# Patient Record
Sex: FEMALE | Race: WHITE | NOT HISPANIC OR LATINO | Employment: OTHER | URBAN - METROPOLITAN AREA
[De-identification: names, ages, dates, MRNs, and addresses within clinical notes are randomized per-mention and may not be internally consistent; named-entity substitution may affect disease eponyms.]

---

## 2018-03-14 ENCOUNTER — OFFICE VISIT (OUTPATIENT)
Dept: ORTHOPEDIC SURGERY | Facility: CLINIC | Age: 68
End: 2018-03-14

## 2018-03-14 VITALS
BODY MASS INDEX: 31.43 KG/M2 | SYSTOLIC BLOOD PRESSURE: 116 MMHG | HEART RATE: 73 BPM | HEIGHT: 64 IN | WEIGHT: 184.08 LBS | DIASTOLIC BLOOD PRESSURE: 78 MMHG

## 2018-03-14 DIAGNOSIS — G62.9 NEUROPATHY: ICD-10-CM

## 2018-03-14 DIAGNOSIS — M21.372 FOOT DROP, LEFT: ICD-10-CM

## 2018-03-14 DIAGNOSIS — M19.079 ARTHRITIS OF FOOT: Primary | ICD-10-CM

## 2018-03-14 PROCEDURE — 99204 OFFICE O/P NEW MOD 45 MIN: CPT | Performed by: PHYSICIAN ASSISTANT

## 2018-03-14 RX ORDER — OMEPRAZOLE 40 MG/1
40 CAPSULE, DELAYED RELEASE ORAL 2 TIMES DAILY
Refills: 1 | COMMUNITY
Start: 2018-01-19 | End: 2022-09-30

## 2018-03-14 NOTE — PROGRESS NOTES
[facility] Note    Subjective     Patient: Tony Brown  : 1950    Primary Care Provider: SOULEYMANE Sales    Requesting Provider: As above    Pain of the Left Foot      History    Chief Complaint: Left foot/leg pain and numbness    History of Present Illness: This is a very pleasant 67-year-old female presenting today to discuss her left leg and foot pain and numbness.  History ports it is been going on for years.  It has progressed.  She has constant pain and numbness.  She does have some increased pain in the foot with weightbearing and walking.  She complains of burning pain, sharp pains.  She rates at 10/10.  She has a history of lumbar pathology and foot drop.  She is seeing Dr. Moraes as well as Dr. Pelaez in the past.  He sees pain management for her back with hydrocodone and morphine.  She has had an SFO brace in the past but does not tolerate it well.  She uses a cane daily for ambulation.  She is here today to see if there are any other options to help improve her pain as well as the numbness in her foot.  She cannot recall taking any Neurontin or nerve medicine in the past.    Current Outpatient Prescriptions on File Prior to Visit   Medication Sig Dispense Refill   • HYDROcodone-acetaminophen (NORCO)  MG per tablet Take 1 tablet by mouth every 6 (six) hours as needed.     • morphine (MSIR) 30 MG tablet Take 1 tablet by mouth every 12 (twelve) hours as needed.     • zolpidem (AMBIEN) 10 MG tablet Take 1 tablet by mouth at night as needed.     • [DISCONTINUED] esomeprazole (NEXIUM) 40 MG capsule Take 1 capsule by mouth daily.       No current facility-administered medications on file prior to visit.       Allergies   Allergen Reactions   • Promethazine       Past Medical History:   Diagnosis Date   • Anxiety    • Arthritis      History reviewed. No pertinent surgical history.  Family History   Problem Relation Age of Onset   • Alcohol abuse Mother    •  "Diabetes Mother    • Heart disease Mother    • Stroke Mother       Social History     Social History   • Marital status: Single     Spouse name: N/A   • Number of children: N/A   • Years of education: N/A     Occupational History   • Not on file.     Social History Main Topics   • Smoking status: Never Smoker   • Smokeless tobacco: Never Used   • Alcohol use No   • Drug use: No   • Sexual activity: Defer     Other Topics Concern   • Not on file     Social History Narrative   • No narrative on file        Review of Systems   HENT: Negative.    Eyes: Negative.    Respiratory: Positive for shortness of breath.    Cardiovascular: Negative.    Gastrointestinal: Negative.    Endocrine: Negative.    Genitourinary: Negative.    Musculoskeletal: Positive for back pain, joint swelling, neck pain and neck stiffness.        Foot Pain    Skin: Negative.    Allergic/Immunologic: Negative.    Neurological: Positive for facial asymmetry, weakness and numbness.   Hematological: Bruises/bleeds easily.   Psychiatric/Behavioral: Negative.        The following portions of the patient's history were reviewed and updated as appropriate: allergies, current medications, past family history, past medical history, past social history, past surgical history and problem list.      Objective      Physical Exam  /78   Pulse 73   Ht 162.5 cm (63.98\")   Wt 83.5 kg (184 lb 1.4 oz)   BMI 31.62 kg/m²     Body mass index is 31.62 kg/m².    GENERAL: Body habitus: obese    Lower extremity edema: Left: trace; Right: trace    Varicose veins:  Left: moderate; Right: moderate    Gait: using cane     Mental Status:  awake and alert; oriented to person, place, and time    Voice:  clear  SKIN:  Normal    Hair Growth:  Right:normal; Left:  normal  HEENT: Head: Normocephalic, atraumatic,  without obvious abnormality.  eye: normal external eye, no icterus   PULM:  Repiratory effort normal    Ortho Exam  V:  Dorsalis Pedis:  Right: 2+; " Left:2+    Posterior Tibial: Right:2+; Left:2+    Capillary Refill:  Brisk  MSK:      Ankle:  Right: non tender, ROM  normal and motor function  normal; Left:  non tender and motor function  3/5 anterior tib/0/5 posterior tib/4/5 peroneal/4/5 gatroc/soleus      Foot:  Right:  non tender; Left:  non tender      NEURO: Heel Walking:  Right:  unable to test; Left:  unable to test    Toe Walking:  Right:  unable to test; Left:  unable to test     [place] 5.07 monofilament test: absent    Lower extremity sensation: diminished  Left normal right    Calf Atrophy:none       Medical Decision Making    Data Review:   ordered and reviewed x-rays today    Assessment:  1. Arthritis of foot    2. Foot drop, left    3. Neuropathy        Plan:  1.  Left foot arthritis.  I reviewed clinical findings and x-rays with the patient today.  X-rays show significant arthritis throughout the midfoot and hindfoot.  There is no evidence of fracture or anything more worrisome.  I explained to the patient that some of her pain weightbearing and walking is likely secondary to arthritis.  I would recommend a pair of custom orthotics to help support the joints and I've given her prescription for this.    2.  Patient has left foot drop with neuropathy.  I reviewed clinical findings with the patient today.  On exam, she has weakness in the anterior tib and posterior tib.  She is insensate to [place].  She reports that this has been ongoing for several years.  She has seen Dr. Moraes as well as Dr. Pelaez years ago for the problem.  She is here today she has any to see if there are any other options.  I explained to the patient that there is nothing that we had our practice can offer her today.  I've given her prescription for a new AFO brace to see if they can give her something that she can tolerate better  Further that think this is important so that she does not fall and hurt herself.  I spent suspect that her weakness and pain  and numbness is coming from her lumbar spine.  Recommendation today is that we get her scheduled to see neurosurgery.  She is seen Dr. Pelaez in the past and she does not want to see him again that we will get her to see another one of his partners.  She will return to see us as needed.      Natacha Rodriguez PA-C  03/14/18  3:42 PM

## 2018-10-31 ENCOUNTER — TRANSCRIBE ORDERS (OUTPATIENT)
Dept: ADMINISTRATIVE | Facility: HOSPITAL | Age: 68
End: 2018-10-31

## 2018-10-31 DIAGNOSIS — R10.11 RUQ ABDOMINAL PAIN: Primary | ICD-10-CM

## 2018-11-16 ENCOUNTER — APPOINTMENT (OUTPATIENT)
Dept: NUCLEAR MEDICINE | Facility: HOSPITAL | Age: 68
End: 2018-11-16

## 2019-09-24 ENCOUNTER — HOSPITAL ENCOUNTER (EMERGENCY)
Facility: HOSPITAL | Age: 69
Discharge: HOME | End: 2019-09-24
Attending: EMERGENCY MEDICINE | Admitting: EMERGENCY MEDICINE

## 2019-09-24 VITALS
BODY MASS INDEX: 30.82 KG/M2 | HEIGHT: 65 IN | DIASTOLIC BLOOD PRESSURE: 67 MMHG | WEIGHT: 185 LBS | HEART RATE: 63 BPM | OXYGEN SATURATION: 97 % | RESPIRATION RATE: 20 BRPM | SYSTOLIC BLOOD PRESSURE: 143 MMHG | TEMPERATURE: 98 F

## 2019-09-24 DIAGNOSIS — N39.0 URINARY TRACT INFECTION IN ELDERLY PATIENT: ICD-10-CM

## 2019-09-24 DIAGNOSIS — T78.40XA ALLERGIC REACTION TO DRUG, INITIAL ENCOUNTER: Primary | ICD-10-CM

## 2019-09-24 DIAGNOSIS — J01.90 ACUTE NON-RECURRENT SINUSITIS, UNSPECIFIED LOCATION: ICD-10-CM

## 2019-09-24 LAB
ALBUMIN SERPL-MCNC: 4.4 G/DL (ref 3.5–5.2)
ALBUMIN/GLOB SERPL: 1.2 G/DL
ALP SERPL-CCNC: 103 U/L (ref 39–117)
ALT SERPL W P-5'-P-CCNC: 14 U/L (ref 1–33)
ANION GAP SERPL CALCULATED.3IONS-SCNC: 18 MMOL/L (ref 5–15)
AST SERPL-CCNC: 27 U/L (ref 1–32)
BACTERIA UR QL AUTO: ABNORMAL /HPF
BASOPHILS # BLD AUTO: 0.02 10*3/MM3 (ref 0–0.2)
BASOPHILS NFR BLD AUTO: 0.3 % (ref 0–1.5)
BILIRUB SERPL-MCNC: 0.3 MG/DL (ref 0.2–1.2)
BILIRUB UR QL STRIP: NEGATIVE
BUN BLD-MCNC: 9 MG/DL (ref 8–23)
BUN/CREAT SERPL: 9.6 (ref 7–25)
CALCIUM SPEC-SCNC: 9.1 MG/DL (ref 8.6–10.5)
CHLORIDE SERPL-SCNC: 103 MMOL/L (ref 98–107)
CLARITY UR: ABNORMAL
CO2 SERPL-SCNC: 19 MMOL/L (ref 22–29)
COLOR UR: ABNORMAL
CREAT BLD-MCNC: 0.94 MG/DL (ref 0.57–1)
DEPRECATED RDW RBC AUTO: 40.9 FL (ref 37–54)
EOSINOPHIL # BLD AUTO: 0.2 10*3/MM3 (ref 0–0.4)
EOSINOPHIL NFR BLD AUTO: 3.2 % (ref 0.3–6.2)
ERYTHROCYTE [DISTWIDTH] IN BLOOD BY AUTOMATED COUNT: 12.6 % (ref 12.3–15.4)
GFR SERPL CREATININE-BSD FRML MDRD: 59 ML/MIN/1.73
GLOBULIN UR ELPH-MCNC: 3.6 GM/DL
GLUCOSE BLD-MCNC: 172 MG/DL (ref 65–99)
GLUCOSE UR STRIP-MCNC: NEGATIVE MG/DL
GRAN CASTS URNS QL MICRO: ABNORMAL /LPF
HCT VFR BLD AUTO: 46.6 % (ref 34–46.6)
HGB BLD-MCNC: 15.1 G/DL (ref 12–15.9)
HGB UR QL STRIP.AUTO: NEGATIVE
HYALINE CASTS UR QL AUTO: ABNORMAL /LPF
IMM GRANULOCYTES # BLD AUTO: 0.02 10*3/MM3 (ref 0–0.05)
IMM GRANULOCYTES NFR BLD AUTO: 0.3 % (ref 0–0.5)
KETONES UR QL STRIP: NEGATIVE
LEUKOCYTE ESTERASE UR QL STRIP.AUTO: NEGATIVE
LYMPHOCYTES # BLD AUTO: 2.68 10*3/MM3 (ref 0.7–3.1)
LYMPHOCYTES NFR BLD AUTO: 42.8 % (ref 19.6–45.3)
MCH RBC QN AUTO: 28.7 PG (ref 26.6–33)
MCHC RBC AUTO-ENTMCNC: 32.4 G/DL (ref 31.5–35.7)
MCV RBC AUTO: 88.6 FL (ref 79–97)
MONOCYTES # BLD AUTO: 0.22 10*3/MM3 (ref 0.1–0.9)
MONOCYTES NFR BLD AUTO: 3.5 % (ref 5–12)
NEUTROPHILS # BLD AUTO: 3.12 10*3/MM3 (ref 1.7–7)
NEUTROPHILS NFR BLD AUTO: 49.9 % (ref 42.7–76)
NITRITE UR QL STRIP: NEGATIVE
NRBC BLD AUTO-RTO: 0 /100 WBC (ref 0–0.2)
PH UR STRIP.AUTO: 5.5 [PH] (ref 5–8)
PLATELET # BLD AUTO: 220 10*3/MM3 (ref 140–450)
PMV BLD AUTO: 10.3 FL (ref 6–12)
POTASSIUM BLD-SCNC: 3.8 MMOL/L (ref 3.5–5.2)
PROT SERPL-MCNC: 8 G/DL (ref 6–8.5)
PROT UR QL STRIP: ABNORMAL
RBC # BLD AUTO: 5.26 10*6/MM3 (ref 3.77–5.28)
RBC # UR: ABNORMAL /HPF
REF LAB TEST METHOD: ABNORMAL
SODIUM BLD-SCNC: 140 MMOL/L (ref 136–145)
SP GR UR STRIP: 1.03 (ref 1–1.03)
SQUAMOUS #/AREA URNS HPF: ABNORMAL /HPF
UROBILINOGEN UR QL STRIP: ABNORMAL
WBC NRBC COR # BLD: 6.26 10*3/MM3 (ref 3.4–10.8)
WBC UR QL AUTO: ABNORMAL /HPF

## 2019-09-24 PROCEDURE — 25010000002 EPINEPHRINE (ANAPHYLAXIS) 1 MG/ML SOLUTION: Performed by: EMERGENCY MEDICINE

## 2019-09-24 PROCEDURE — 96372 THER/PROPH/DIAG INJ SC/IM: CPT

## 2019-09-24 PROCEDURE — 80053 COMPREHEN METABOLIC PANEL: CPT | Performed by: EMERGENCY MEDICINE

## 2019-09-24 PROCEDURE — 85025 COMPLETE CBC W/AUTO DIFF WBC: CPT | Performed by: EMERGENCY MEDICINE

## 2019-09-24 PROCEDURE — 96374 THER/PROPH/DIAG INJ IV PUSH: CPT

## 2019-09-24 PROCEDURE — 96375 TX/PRO/DX INJ NEW DRUG ADDON: CPT

## 2019-09-24 PROCEDURE — 99284 EMERGENCY DEPT VISIT MOD MDM: CPT

## 2019-09-24 PROCEDURE — 25010000002 DIPHENHYDRAMINE PER 50 MG: Performed by: EMERGENCY MEDICINE

## 2019-09-24 PROCEDURE — 81001 URINALYSIS AUTO W/SCOPE: CPT | Performed by: EMERGENCY MEDICINE

## 2019-09-24 PROCEDURE — 25010000002 METHYLPREDNISOLONE PER 40 MG: Performed by: EMERGENCY MEDICINE

## 2019-09-24 RX ORDER — EPINEPHRINE 0.3 MG/.3ML
0.3 INJECTION SUBCUTANEOUS ONCE
Qty: 1 EACH | Refills: 0 | Status: SHIPPED | OUTPATIENT
Start: 2019-09-24 | End: 2019-09-24

## 2019-09-24 RX ORDER — DIPHENHYDRAMINE HYDROCHLORIDE 50 MG/ML
50 INJECTION INTRAMUSCULAR; INTRAVENOUS ONCE
Status: COMPLETED | OUTPATIENT
Start: 2019-09-24 | End: 2019-09-24

## 2019-09-24 RX ORDER — CEFDINIR 300 MG/1
300 CAPSULE ORAL 2 TIMES DAILY
COMMUNITY
End: 2019-09-24

## 2019-09-24 RX ORDER — PREDNISONE 20 MG/1
20 TABLET ORAL DAILY
Qty: 5 TABLET | Refills: 0 | Status: SHIPPED | OUTPATIENT
Start: 2019-09-24 | End: 2022-06-13

## 2019-09-24 RX ORDER — EPINEPHRINE 1 MG/ML
0.3 INJECTION, SOLUTION INTRAMUSCULAR; SUBCUTANEOUS ONCE
Status: COMPLETED | OUTPATIENT
Start: 2019-09-24 | End: 2019-09-24

## 2019-09-24 RX ORDER — NITROFURANTOIN 25; 75 MG/1; MG/1
100 CAPSULE ORAL 2 TIMES DAILY
Qty: 14 CAPSULE | Refills: 0 | Status: SHIPPED | OUTPATIENT
Start: 2019-09-24 | End: 2022-09-30

## 2019-09-24 RX ORDER — NAPROXEN SODIUM 220 MG
220 TABLET ORAL 2 TIMES DAILY PRN
COMMUNITY
End: 2022-09-30

## 2019-09-24 RX ORDER — FEXOFENADINE HCL 180 MG/1
180 TABLET ORAL DAILY
Qty: 30 TABLET | Refills: 0 | Status: SHIPPED | OUTPATIENT
Start: 2019-09-24 | End: 2022-09-30

## 2019-09-24 RX ORDER — METHYLPREDNISOLONE SODIUM SUCCINATE 40 MG/ML
40 INJECTION, POWDER, LYOPHILIZED, FOR SOLUTION INTRAMUSCULAR; INTRAVENOUS ONCE
Status: COMPLETED | OUTPATIENT
Start: 2019-09-24 | End: 2019-09-24

## 2019-09-24 RX ORDER — RANITIDINE 150 MG/1
150 TABLET ORAL 2 TIMES DAILY
Qty: 28 TABLET | Refills: 0 | Status: SHIPPED | OUTPATIENT
Start: 2019-09-24 | End: 2022-09-30

## 2019-09-24 RX ORDER — FAMOTIDINE 10 MG/ML
20 INJECTION, SOLUTION INTRAVENOUS ONCE
Status: COMPLETED | OUTPATIENT
Start: 2019-09-24 | End: 2019-09-24

## 2019-09-24 RX ADMIN — METHYLPREDNISOLONE SODIUM SUCCINATE 40 MG: 40 INJECTION, POWDER, FOR SOLUTION INTRAMUSCULAR; INTRAVENOUS at 18:05

## 2019-09-24 RX ADMIN — EPINEPHRINE 0.3 MG: 1 INJECTION INTRAMUSCULAR; INTRAVENOUS; SUBCUTANEOUS at 18:07

## 2019-09-24 RX ADMIN — DIPHENHYDRAMINE HYDROCHLORIDE 50 MG: 50 INJECTION, SOLUTION INTRAMUSCULAR; INTRAVENOUS at 17:59

## 2019-09-24 RX ADMIN — FAMOTIDINE 20 MG: 10 INJECTION, SOLUTION INTRAVENOUS at 17:56

## 2019-09-24 NOTE — ED PROVIDER NOTES
Subjective   Tony Brown is a 69 y.o. female who presents to the ED with complaints of itching. The patient reports that she was visiting family in the ICU an hour prior to arrival and began to feel itching all over her body. She was seen at the ED in [place] last night due to numbness in her legs as well as a sinus infection and urinary tract infection. She was prescribed Omnicef which she took approximately 1 hour before her symptoms began. She has a history of Crohn's disease. There are no other acute complaints at this time.         History provided by:  Patient  Itching   Quality:  Itching  Severity:  Moderate  Onset quality:  Sudden  Timing:  Constant  Progression:  Improving  Chronicity:  New      Review of Systems   Skin: Positive for itching.        Itching.    Neurological: Positive for numbness.   All other systems reviewed and are negative.      Past Medical History:   Diagnosis Date   • Anxiety    • Arthritis    • Crohn's disease (CMS/HCC)        Allergies   Allergen Reactions   • Promethazine        Past Surgical History:   Procedure Laterality Date   • ABDOMINAL SURGERY     • HYSTERECTOMY         Family History   Problem Relation Age of Onset   • Alcohol abuse Mother    • Diabetes Mother    • Heart disease Mother    • Stroke Mother        Social History     Socioeconomic History   • Marital status: Single     Spouse name: Not on file   • Number of children: Not on file   • Years of education: Not on file   • Highest education level: Not on file   Tobacco Use   • Smoking status: Never Smoker   • Smokeless tobacco: Never Used   Substance and Sexual Activity   • Alcohol use: No   • Drug use: No   • Sexual activity: Defer         Objective   Physical Exam   Constitutional: She is oriented to person, place, and time. She appears well-developed and well-nourished.   Patient appears uncomfortable.    HENT:   Head: Normocephalic and atraumatic.   Eyes: Conjunctivae are normal. No scleral icterus.   Neck:  Normal range of motion. Neck supple.   Cardiovascular: Normal rate, regular rhythm, normal heart sounds and intact distal pulses.   Pulmonary/Chest: Effort normal and breath sounds normal.   Abdominal: Soft. There is no tenderness.   Musculoskeletal: Normal range of motion.   Neurological: She is alert and oriented to person, place, and time.   Skin: Skin is warm and dry. There is erythema.   Erythematous with diffuse hives.    Psychiatric: Her behavior is normal. Her mood appears anxious.   Nursing note and vitals reviewed.      Critical Care  Performed by: Tyree May MD  Authorized by: Tyree May MD     Critical care provider statement:     Critical care time (minutes):  30    Critical care time was exclusive of:  Separately billable procedures and treating other patients    Critical care was necessary to treat or prevent imminent or life-threatening deterioration of the following conditions: allergic reaction.    Critical care was time spent personally by me on the following activities:  Development of treatment plan with patient or surrogate, evaluation of patient's response to treatment, examination of patient, obtaining history from patient or surrogate, ordering and performing treatments and interventions, pulse oximetry, re-evaluation of patient's condition, review of old charts and ordering and review of laboratory studies               ED Course  ED Course as of Sep 24 2325   Tue Sep 24, 2019   1917 Patient reports feeling much better and is resting comfortably.  She is in no distress.  [RS]   1938 Patient is back to baseline and is resting company.  She states she is ready to go home.  Findings with plan discussed.  Patient is to discontinue the Cefdinir.  We will write her Macrobid for her urinary tract infection.  We will manage symptomatically for her sinusitis.  She is to follow-up with her doctor for ongoing care. I had a discussion with the patient/family regarding diagnosis,  diagnostic results, treatment plan, and medications.  The patient/family indicated understanding of these instructions.  I spent adequate time at the bedside proceeding discharge necessary to personally discuss the aftercare instructions, giving patient education, providing explanations of the results of our evaluations/findings, and my decision making to assure that the patient/family understand the plan of care.  Time was allotted to answer questions at that time and throughout the ED course.  Emphasis was placed on timely follow-up after discharge.  I also discussed the potential for the development of an acute emergent condition requiring further evaluation, admission, or even surgical intervention. I discussed that we found nothing during the visit today indicating the need for further workup, admission, or the presence of an unstable medical condition.  I encouraged the patient to return to the emergency department immediately for ANY concerns, worsening, new complaints, or if symptoms persist and unable to seek follow-up in a timely fashion.  The patient/family expressed understanding and agreement with this plan.   [RS]      ED Course User Index  [RS] Tyree May MD       Recent Results (from the past 24 hour(s))   Comprehensive Metabolic Panel    Collection Time: 09/24/19  6:00 PM   Result Value Ref Range    Glucose 172 (H) 65 - 99 mg/dL    BUN 9 8 - 23 mg/dL    Creatinine 0.94 0.57 - 1.00 mg/dL    Sodium 140 136 - 145 mmol/L    Potassium 3.8 3.5 - 5.2 mmol/L    Chloride 103 98 - 107 mmol/L    CO2 19.0 (L) 22.0 - 29.0 mmol/L    Calcium 9.1 8.6 - 10.5 mg/dL    Total Protein 8.0 6.0 - 8.5 g/dL    Albumin 4.40 3.50 - 5.20 g/dL    ALT (SGPT) 14 1 - 33 U/L    AST (SGOT) 27 1 - 32 U/L    Alkaline Phosphatase 103 39 - 117 U/L    Total Bilirubin 0.3 0.2 - 1.2 mg/dL    eGFR Non African Amer 59 (L) >60 mL/min/1.73    Globulin 3.6 gm/dL    A/G Ratio 1.2 g/dL    BUN/Creatinine Ratio 9.6 7.0 - 25.0    Anion  Gap 18.0 (H) 5.0 - 15.0 mmol/L   CBC Auto Differential    Collection Time: 09/24/19  6:00 PM   Result Value Ref Range    WBC 6.26 3.40 - 10.80 10*3/mm3    RBC 5.26 3.77 - 5.28 10*6/mm3    Hemoglobin 15.1 12.0 - 15.9 g/dL    Hematocrit 46.6 34.0 - 46.6 %    MCV 88.6 79.0 - 97.0 fL    MCH 28.7 26.6 - 33.0 pg    MCHC 32.4 31.5 - 35.7 g/dL    RDW 12.6 12.3 - 15.4 %    RDW-SD 40.9 37.0 - 54.0 fl    MPV 10.3 6.0 - 12.0 fL    Platelets 220 140 - 450 10*3/mm3    Neutrophil % 49.9 42.7 - 76.0 %    Lymphocyte % 42.8 19.6 - 45.3 %    Monocyte % 3.5 (L) 5.0 - 12.0 %    Eosinophil % 3.2 0.3 - 6.2 %    Basophil % 0.3 0.0 - 1.5 %    Immature Grans % 0.3 0.0 - 0.5 %    Neutrophils, Absolute 3.12 1.70 - 7.00 10*3/mm3    Lymphocytes, Absolute 2.68 0.70 - 3.10 10*3/mm3    Monocytes, Absolute 0.22 0.10 - 0.90 10*3/mm3    Eosinophils, Absolute 0.20 0.00 - 0.40 10*3/mm3    Basophils, Absolute 0.02 0.00 - 0.20 10*3/mm3    Immature Grans, Absolute 0.02 0.00 - 0.05 10*3/mm3    nRBC 0.0 0.0 - 0.2 /100 WBC   Urinalysis With Microscopic If Indicated (No Culture) - Urine, Catheter    Collection Time: 09/24/19  6:13 PM   Result Value Ref Range    Color, UA Dark Yellow (A) Yellow, Straw    Appearance, UA Cloudy (A) Clear    pH, UA 5.5 5.0 - 8.0    Specific Gravity, UA 1.032 (H) 1.001 - 1.030    Glucose, UA Negative Negative    Ketones, UA Negative Negative    Bilirubin, UA Negative Negative    Blood, UA Negative Negative    Protein, UA 30 mg/dL (1+) (A) Negative    Leuk Esterase, UA Negative Negative    Nitrite, UA Negative Negative    Urobilinogen, UA 1.0 E.U./dL 0.2 - 1.0 E.U./dL   Urinalysis, Microscopic Only - Urine, Catheter    Collection Time: 09/24/19  6:13 PM   Result Value Ref Range    RBC, UA 0-2 None Seen, 0-2 /HPF    WBC, UA 0-2 None Seen, 0-2 /HPF    Bacteria, UA None Seen None Seen, Trace /HPF    Squamous Epithelial Cells, UA 3-6 (A) None Seen, 0-2 /HPF    Hyaline Casts, UA Too Numerous to Count 0 - 6 /LPF    Granular Casts, UA  31-50 None Seen /LPF    Methodology Manual Light Microscopy      Note: In addition to lab results from this visit, the labs listed above may include labs taken at another facility or during a different encounter within the last 24 hours. Please correlate lab times with ED admission and discharge times for further clarification of the services performed during this visit.    No orders to display     Vitals:    09/24/19 1900 09/24/19 1915 09/24/19 1930 09/24/19 1945   BP: 140/87 145/70 137/64 143/67   Pulse: 65 61  63   Resp:       Temp:       TempSrc:       SpO2: 98% 97%  97%   Weight:       Height:         Medications   diphenhydrAMINE (BENADRYL) injection 50 mg (50 mg Intravenous Given 9/24/19 1759)   famotidine (PEPCID) injection 20 mg (20 mg Intravenous Given 9/24/19 1756)   methylPREDNISolone sodium succinate (SOLU-Medrol) injection 40 mg (40 mg Intravenous Given 9/24/19 1805)   EPINEPHrine (Anaphylaxis) (ADRENALIN) injection 0.3 mg (0.3 mg Intramuscular Given 9/24/19 1807)     ECG/EMG Results (last 24 hours)     ** No results found for the last 24 hours. **        No orders to display                     MDM  Number of Diagnoses or Management Options  Acute non-recurrent sinusitis, unspecified location:   Allergic reaction to drug, initial encounter:   Urinary tract infection in elderly patient:      Amount and/or Complexity of Data Reviewed  Clinical lab tests: reviewed        Final diagnoses:   Allergic reaction to drug, initial encounter   Urinary tract infection in elderly patient   Acute non-recurrent sinusitis, unspecified location       Documentation assistance provided by ashley Zepeda.  Information recorded by the ashley was done at my direction and has been verified and validated by me.     Susanne Zepeda  09/24/19 1935       Susanne Zepeda  09/24/19 2050       Tyree May MD  09/24/19 [id number]

## 2022-04-05 NOTE — TELEPHONE ENCOUNTER
Rx Refill Note    Requested Prescriptions     Pending Prescriptions Disp Refills   • potassium chloride (MICRO-K) 10 MEQ CR capsule [Pharmacy Med Name: POTASSIUM CL ER 10 MEQ CAPSULE] 30 capsule      Sig: TAKE 1 CAPSULE BY MOUTH EVERY DAY WITH FOOD        Last office visit with prescribing clinician: via [employer] 02/22/22      Next office visit with prescribing clinician: Visit date not found   Last labs: 10/26/21 cmp  Last refill: 03/12/22  Pharmacy (be sure to add in Epic). correct

## 2022-04-06 RX ORDER — POTASSIUM CHLORIDE 750 MG/1
CAPSULE, EXTENDED RELEASE ORAL
Qty: 30 CAPSULE | Refills: 0 | Status: SHIPPED | OUTPATIENT
Start: 2022-04-06 | End: 2022-05-03

## 2022-04-14 ENCOUNTER — TELEPHONE (OUTPATIENT)
Dept: FAMILY MEDICINE CLINIC | Facility: CLINIC | Age: 72
End: 2022-04-14

## 2022-05-03 ENCOUNTER — TELEPHONE (OUTPATIENT)
Dept: FAMILY MEDICINE CLINIC | Facility: CLINIC | Age: 72
End: 2022-05-03

## 2022-05-03 RX ORDER — POTASSIUM CHLORIDE 750 MG/1
CAPSULE, EXTENDED RELEASE ORAL
Qty: 30 CAPSULE | Refills: 0 | Status: SHIPPED | OUTPATIENT
Start: 2022-05-03 | End: 2022-12-23

## 2022-05-03 NOTE — TELEPHONE ENCOUNTER
Rx Refill Note    Requested Prescriptions     Pending Prescriptions Disp Refills   • potassium chloride (MICRO-K) 10 MEQ CR capsule [Pharmacy Med Name: POTASSIUM CL ER 10 MEQ CAPSULE] 30 capsule 0     Sig: TAKE 1 CAPSULE BY MOUTH EVERY DAY WITH FOOD        Last office visit with prescribing clinician: Visit date not found      Next office visit with prescribing clinician: Visit date not found   Last labs:   Last refill: 03/12/22 for 30   Pharmacy (be sure to add in Epic). correct

## 2022-05-12 PROBLEM — K44.9 HIATAL HERNIA: Status: ACTIVE | Noted: 2021-08-26

## 2022-05-12 PROBLEM — K21.9 GASTROESOPHAGEAL REFLUX DISEASE: Status: ACTIVE | Noted: 2021-08-26

## 2022-05-12 PROBLEM — G62.9 PERIPHERAL NEUROPATHY: Status: ACTIVE | Noted: 2022-05-12

## 2022-05-12 PROBLEM — R13.10 DYSPHAGIA: Status: ACTIVE | Noted: 2021-08-26

## 2022-05-12 PROBLEM — G89.29 CHRONIC PAIN: Status: ACTIVE | Noted: 2022-05-12

## 2022-05-12 PROBLEM — Z98.61 CAD S/P PERCUTANEOUS CORONARY ANGIOPLASTY: Status: ACTIVE | Noted: 2021-12-08

## 2022-05-12 PROBLEM — K22.70 BARRETT'S ESOPHAGUS WITHOUT DYSPLASIA: Status: ACTIVE | Noted: 2021-06-22

## 2022-05-12 PROBLEM — G57.30 SUPERFICIAL PERONEAL NERVE NEUROPATHY: Status: ACTIVE | Noted: 2022-05-12

## 2022-05-12 PROBLEM — E78.00 HIGH CHOLESTEROL: Status: ACTIVE | Noted: 2021-08-26

## 2022-05-12 PROBLEM — K50.90 CROHN DISEASE: Status: ACTIVE | Noted: 2021-08-26

## 2022-05-12 PROBLEM — I10 HTN (HYPERTENSION): Status: ACTIVE | Noted: 2021-08-26

## 2022-05-12 PROBLEM — R20.8 ALLODYNIA: Status: ACTIVE | Noted: 2022-05-12

## 2022-05-12 PROBLEM — M51.36 DEGENERATIVE DISC DISEASE, LUMBAR: Status: ACTIVE | Noted: 2021-08-26

## 2022-05-12 PROBLEM — I25.10 CAD S/P PERCUTANEOUS CORONARY ANGIOPLASTY: Status: ACTIVE | Noted: 2021-12-08

## 2022-06-13 ENCOUNTER — OFFICE VISIT (OUTPATIENT)
Dept: FAMILY MEDICINE CLINIC | Facility: CLINIC | Age: 72
End: 2022-06-13

## 2022-06-13 VITALS
BODY MASS INDEX: 25.99 KG/M2 | HEIGHT: 65 IN | OXYGEN SATURATION: 97 % | DIASTOLIC BLOOD PRESSURE: 88 MMHG | HEART RATE: 78 BPM | RESPIRATION RATE: 14 BRPM | SYSTOLIC BLOOD PRESSURE: 146 MMHG | WEIGHT: 156 LBS

## 2022-06-13 DIAGNOSIS — R09.81 NASAL CONGESTION: ICD-10-CM

## 2022-06-13 DIAGNOSIS — J02.9 SORE THROAT: ICD-10-CM

## 2022-06-13 DIAGNOSIS — J20.9 ACUTE BRONCHITIS, UNSPECIFIED ORGANISM: Primary | ICD-10-CM

## 2022-06-13 PROCEDURE — 99213 OFFICE O/P EST LOW 20 MIN: CPT | Performed by: FAMILY MEDICINE

## 2022-06-13 RX ORDER — DOXYCYCLINE HYCLATE 100 MG/1
100 CAPSULE ORAL 2 TIMES DAILY
Qty: 14 CAPSULE | Refills: 0 | Status: SHIPPED | OUTPATIENT
Start: 2022-06-13 | End: 2022-09-30

## 2022-06-13 NOTE — PROGRESS NOTES
"Chief Complaint  Sore Throat (Patient complains symptoms have been going on for several days. ), Nasal Congestion, and Breathing Problem    Subjective          Tony Brown presents to [facility]  Patient is a 71-year-old female who presents with about 4 days of cough congestion nasal drainage no wheezing no trouble breathing which she notes a mild fever.  She does have a history of previous pneumonia in the past.  She has not taken anything medication wise to help relieve her symptoms.  She has had her COVID vaccinations.  No known COVID exposure.  No nausea vomiting eating and drinking appropriately.    Sore Throat   This is a new problem. Neither side of throat is experiencing more pain than the other. The maximum temperature recorded prior to her arrival was 100.4 - 100.9 F. Associated symptoms include coughing. Pertinent negatives include no shortness of breath, stridor, swollen glands, trouble swallowing or vomiting.   Breathing Problem  She complains of cough and difficulty breathing. There is no shortness of breath. Associated symptoms include sneezing and a sore throat. Pertinent negatives include no trouble swallowing.       Objective   Vital Signs:   /88 (BP Location: Left arm, Patient Position: Sitting, Cuff Size: Adult)   Pulse 78   Resp 14   Ht 165.1 cm (65\")   Wt 70.8 kg (156 lb)   SpO2 97%   BMI 25.96 kg/m²     Body mass index is 25.96 kg/m².    Review of Systems   Constitutional: Negative.    HENT: Positive for sinus pressure, sneezing and sore throat. Negative for swollen glands and trouble swallowing.    Eyes: Negative.    Respiratory: Positive for cough. Negative for shortness of breath and stridor.    Cardiovascular: Negative.    Gastrointestinal: Negative.  Negative for vomiting.   Endocrine: Negative.    Genitourinary: Negative.    Musculoskeletal: Negative.    Skin: Negative.    Allergic/Immunologic: Negative.    Neurological: Negative.  "   Hematological: Negative.    Psychiatric/Behavioral: Negative.        Past History:  Medical History: has a past medical history of Anxiety, Arthritis, and Crohn's disease (HCC).   Surgical History: has a past surgical history that includes Hysterectomy and Abdominal surgery.   Family History: family history includes Alcohol abuse in her mother; Diabetes in her mother; Heart disease in her mother; Stroke in her mother.   Social History: reports that she has never smoked. She has never used smokeless tobacco. She reports that she does not drink alcohol and does not use drugs.      Current Outpatient Medications:   •  aspirin 81 MG chewable tablet, Chew 81 mg Daily., Disp: , Rfl:   •  atorvastatin (LIPITOR) 80 MG tablet, Take 80 mg by mouth Daily., Disp: , Rfl:   •  atorvastatin (LIPITOR) 80 MG tablet, Take 80 mg by mouth Daily., Disp: , Rfl:   •  baclofen (LIORESAL) 10 MG tablet, Take 1 tablet by mouth., Disp: , Rfl:   •  carvedilol (COREG) 6.25 MG tablet, Take 6.25 mg by mouth 2 (Two) Times a Day With Meals., Disp: , Rfl:   •  clopidogrel (PLAVIX) 75 MG tablet, TAKE 1 TABLET BY MOUTH 1 TIME EACH DAY., Disp: , Rfl:   •  cyanocobalamin 1000 MCG/ML injection, INJECT 1 MILLILITER SUBCUTANEOUSLY ONCE A MONTH, Disp: , Rfl:   •  DULoxetine (CYMBALTA) 30 MG capsule, TAKE 2 CAPSULES EVERY DAY BY ORAL ROUTE., Disp: , Rfl:   •  Enoxaparin Sodium (LOVENOX) 40 MG/0.4ML solution prefilled syringe syringe, INJECT CONTENTS OF 1 PEN UNDER THE SKIN ONCE DAILY FOR 12 DAYS, Disp: , Rfl:   •  fexofenadine (ALLEGRA ALLERGY) 180 MG tablet, Take 1 tablet by mouth Daily., Disp: 30 tablet, Rfl: 0  •  furosemide (LASIX) 40 MG tablet, TAKE 1 TABLET BY MOUTH EVERY DAY AS NEEDED FOR 30 DAYS, Disp: , Rfl:   •  HYDROcodone-acetaminophen (NORCO)  MG per tablet, Take 1 tablet by mouth every 6 (six) hours as needed., Disp: , Rfl:   •  HYDROcodone-acetaminophen (NORCO) 5-325 MG per tablet, Take 1 tablet by mouth Every 6 (Six) Hours As Needed.  for pain, Disp: , Rfl:   •  hydrOXYzine (ATARAX) 25 MG tablet, Take 25 mg by mouth 2 (Two) Times a Day As Needed., Disp: , Rfl:   •  hydrOXYzine pamoate (VISTARIL) 25 MG capsule, hydroxyzine pamoate 25 mg capsule  TAKE 1 CAPSULE BY MOUTH 4 TIMES DAILY AS NEEDED FOR ITCHING, Disp: , Rfl:   •  isosorbide mononitrate (IMDUR) 30 MG 24 hr tablet, Take 30 mg by mouth Daily., Disp: , Rfl:   •  isosorbide mononitrate (IMDUR) 60 MG 24 hr tablet, TAKE 1 TABLET BY MOUTH EVERY DAY IN THE MORNING FOR 90 DAYS, Disp: , Rfl:   •  lidocaine (LIDODERM) 5 %, 1 PATCH TOPICAL DAILY LEAVE ON MOST PAINFUL AREA FOR UP TO 12 HRS, Disp: , Rfl:   •  morphine (MSIR) 30 MG tablet, Take 1 tablet by mouth every 12 (twelve) hours as needed., Disp: , Rfl:   •  naproxen (NAPROSYN) 500 MG tablet, Take 500 mg by mouth 2 (Two) Times a Day., Disp: , Rfl:   •  naproxen sodium (ALEVE) 220 MG tablet, Take 220 mg by mouth 2 (Two) Times a Day As Needed., Disp: , Rfl:   •  nebivolol (BYSTOLIC) 5 MG tablet, Bystolic 5 mg tablet, Disp: , Rfl:   •  nitrofurantoin, macrocrystal-monohydrate, (MACROBID) 100 MG capsule, Take 1 capsule by mouth 2 (Two) Times a Day., Disp: 14 capsule, Rfl: 0  •  omeprazole (priLOSEC) 40 MG capsule, Take 40 mg by mouth 2 (Two) Times a Day., Disp: , Rfl: 1  •  ondansetron ODT (ZOFRAN-ODT) 4 MG disintegrating tablet, ondansetron 4 mg disintegrating tablet, Disp: , Rfl:   •  oxyCODONE (ROXICODONE) 5 MG immediate release tablet, TAKE 1.5 TABLETS 4 TIMES A DAY AS NEEDED FOR PAIN, Disp: , Rfl:   •  pantoprazole (PROTONIX) 40 MG EC tablet, TAKE 1 TABLET BY MOUTH 2 TIMES A DAY BEFORE MEALS. DO NOT CRUSH CHEW OR SPLIT, Disp: , Rfl:   •  potassium chloride (MICRO-K) 10 MEQ CR capsule, TAKE 1 CAPSULE BY MOUTH EVERY DAY WITH FOOD, Disp: 30 capsule, Rfl: 0  •  raNITIdine (ZANTAC) 150 MG tablet, Take 1 tablet by mouth 2 (Two) Times a Day., Disp: 28 tablet, Rfl: 0  •  ranolazine (RANEXA) 1000 MG 12 hr tablet, TAKE 1 TABLET BY MOUTH TWICE A DAY FOR 90  DAYS, Disp: , Rfl:   •  ranolazine (RANEXA) 500 MG 12 hr tablet, Take 500 mg by mouth., Disp: , Rfl:   •  thiamine (VITAMIN B-1) 100 MG tablet tablet, TAKE 1 TABLET BY MOUTH 1 TIME EACH DAY., Disp: , Rfl:   •  tiZANidine (ZANAFLEX) 4 MG tablet, tizanidine 4 mg tablet  TAKE 1 TO 2 TABLETS BY MOUTH AT BEDTIME, Disp: , Rfl:   •  trimethoprim-polymyxin b (POLYTRIM) 81373-6.1 UNIT/ML-% ophthalmic solution, polymyxin B sulfate 10,000 unit-trimethoprim 1 mg/mL eye drops, Disp: , Rfl:   •  zolpidem (AMBIEN) 10 MG tablet, Take 1 tablet by mouth at night as needed., Disp: , Rfl:   •  doxycycline (VIBRAMYCIN) 100 MG capsule, Take 1 capsule by mouth 2 (Two) Times a Day., Disp: 14 capsule, Rfl: 0    Allergies: Promethazine    Physical Exam  Constitutional:       Appearance: Normal appearance. She is normal weight.   HENT:      Head: Normocephalic and atraumatic.   Eyes:      Conjunctiva/sclera: Conjunctivae normal.   Cardiovascular:      Rate and Rhythm: Normal rate and regular rhythm.   Pulmonary:      Effort: Pulmonary effort is normal.      Breath sounds: Normal breath sounds.   Musculoskeletal:         General: Normal range of motion.      Cervical back: Normal range of motion and neck supple.   Skin:     General: Skin is warm and dry.   Neurological:      General: No focal deficit present.      Mental Status: She is alert and oriented to person, place, and time. Mental status is at baseline.   Psychiatric:         Mood and Affect: Mood normal.         Behavior: Behavior normal.         Thought Content: Thought content normal.         Judgment: Judgment normal.          Result Review :                   Assessment and Plan    Diagnoses and all orders for this visit:    1. Acute bronchitis, unspecified organism (Primary)  Negative rapid COVID test  For now vitals are stable respiratory exam is stable no evidence of acute respiratory distress we will go ahead and treat for acute bronchitis with a course of doxycycline she has  been told however if her symptoms worsen or persist return for evaluation of course if any wheezing or trouble breathing instructed to go to the ER    2. Sore throat  -     COVID-19,[facility] IN-HOUSE,Nasal Swab No Transport Media 3-4 HR TAT - Swab, Nasal Cavity; Future  -     COVID-19,[facility] [employer] IN-HOUSE,Nasal Swab No Transport Media 3-4 HR TAT - Swab, Nasal Cavity; Future  As above    3. Nasal congestion  -     COVID-19,[facility] IN-HOUSE,Nasal Swab No Transport Media 3-4 HR TAT - Swab, Nasal Cavity; Future  -     COVID-19,[facility] [employer] IN-HOUSE,Nasal Swab No Transport Media 3-4 HR TAT - Swab, Nasal Cavity; Future  As above    Other orders  -     doxycycline (VIBRAMYCIN) 100 MG capsule; Take 1 capsule by mouth 2 (Two) Times a Day.  Dispense: 14 capsule; Refill: 0      MEDICATION SIDE EFFECTS, RISKS AND SIDE EFFECTS HAVE BEEN DISCUSSED WITH PATIENT. PATIENT NOTES UNDERSTANDING. IF ANY CONCERN OR QUESTION REGARDING MEDICATION PLEASE CONTACT.         Follow Up   No follow-ups on file.  Patient was given instructions and counseling regarding her condition or for health maintenance advice. Please see specific information pulled into the AVS if appropriate.     Rissa Milligan DO

## 2022-08-24 ENCOUNTER — HOME HEALTH ADMISSION (OUTPATIENT)
Dept: HOME HEALTH SERVICES | Facility: HOME HEALTHCARE | Age: 72
End: 2022-08-24

## 2022-08-24 ENCOUNTER — TRANSCRIBE ORDERS (OUTPATIENT)
Dept: HOME HEALTH SERVICES | Facility: HOME HEALTHCARE | Age: 72
End: 2022-08-24

## 2022-08-24 DIAGNOSIS — K56.609 SMALL BOWEL OBSTRUCTION: Primary | ICD-10-CM

## 2022-08-25 ENCOUNTER — TELEPHONE (OUTPATIENT)
Dept: FAMILY MEDICINE CLINIC | Facility: CLINIC | Age: 72
End: 2022-08-25

## 2022-08-25 NOTE — TELEPHONE ENCOUNTER
LUCIANA WITH [facility] STATED PT. WAS ADMITTED TO [facility] FOR A DAY AND WAS DISCHARGED. CHRISTINA IS WANTING TO KNOW IF DR. WOODS WILL SIGN PT. HOME HEALTH ORDERS. THEY WILL TAKE A VERBAL.     PHONE NUMBER: [phone]

## 2022-08-26 NOTE — TELEPHONE ENCOUNTER
[facility] called back to follow up on request for Dr. Milligan to sign home health care orders.  She would like a call back. Ph: [phone]

## 2022-09-07 ENCOUNTER — TELEPHONE (OUTPATIENT)
Dept: FAMILY MEDICINE CLINIC | Facility: CLINIC | Age: 72
End: 2022-09-07

## 2022-09-07 NOTE — TELEPHONE ENCOUNTER
Patient called and wanted an appt because she has been experiencing (R) hand numbness and chest pain on and off today, and is becoming worse.  I advised her to go to the ER d/t we have no openings, and she agreed.  She wants to know if Dr. Milligan can call [facility] to let them know she is coming.    She also wanted to let us know that she missed home health because she wasn't home when they came.  She believes it was last Friday or Monday.  She hasn't been able to get a hold of them since.

## 2022-09-09 NOTE — TELEPHONE ENCOUNTER
I did not get this message until late yesterday evening can someone check on patient and see how she is doing.

## 2022-09-15 ENCOUNTER — TELEPHONE (OUTPATIENT)
Dept: FAMILY MEDICINE CLINIC | Facility: CLINIC | Age: 72
End: 2022-09-15

## 2022-09-15 NOTE — TELEPHONE ENCOUNTER
Caller: CHALINO WITH [facility]    Relationship: [facility]    Best call back number: [phone]    What orders are you requesting (i.e. lab or imaging): HOME HEALTH NURSING    In what timeframe would the patient need to come in: AS SOON AS POSSIBLE    Where will you receive your lab/imaging services: HOME HEALTH WITH [facility]    Additional notes: PLEASE SEND ORDERS FOR ONE TIME PER WEEK FOR 3 WEEKS. AND 2 WEEKEND PHONE VISITS    PATIENT ON PLAVIX- ASKING IF SHE NEEDS TO GET PT/INR CHECKED- AT THIS TIME SHE DOESN'T HAVE THIS DONE.    VERBAL ORDERS OK.    PLEASE CALL [phone] [facility] OK TO LEAVE DETAILED MESSAGE IF NO ANSWER

## 2022-09-15 NOTE — TELEPHONE ENCOUNTER
Caller: [facility]    Relationship: NURSE     Blake call back number: [phone]    What medications are you currently taking:   Current Outpatient Medications on File Prior to Visit   Medication Sig Dispense Refill   • aspirin 81 MG chewable tablet Chew 81 mg Daily.     • atorvastatin (LIPITOR) 80 MG tablet Take 80 mg by mouth Daily.     • atorvastatin (LIPITOR) 80 MG tablet Take 80 mg by mouth Daily.     • baclofen (LIORESAL) 10 MG tablet Take 1 tablet by mouth.     • carvedilol (COREG) 6.25 MG tablet Take 6.25 mg by mouth 2 (Two) Times a Day With Meals.     • clopidogrel (PLAVIX) 75 MG tablet TAKE 1 TABLET BY MOUTH 1 TIME EACH DAY.     • cyanocobalamin 1000 MCG/ML injection INJECT 1 MILLILITER SUBCUTANEOUSLY ONCE A MONTH     • doxycycline (VIBRAMYCIN) 100 MG capsule Take 1 capsule by mouth 2 (Two) Times a Day. 14 capsule 0   • DULoxetine (CYMBALTA) 30 MG capsule TAKE 2 CAPSULES EVERY DAY BY ORAL ROUTE.     • Enoxaparin Sodium (LOVENOX) 40 MG/0.4ML solution prefilled syringe syringe INJECT CONTENTS OF 1 PEN UNDER THE SKIN ONCE DAILY FOR 12 DAYS     • fexofenadine (ALLEGRA ALLERGY) 180 MG tablet Take 1 tablet by mouth Daily. 30 tablet 0   • furosemide (LASIX) 40 MG tablet TAKE 1 TABLET BY MOUTH EVERY DAY AS NEEDED FOR 30 DAYS     • HYDROcodone-acetaminophen (NORCO)  MG per tablet Take 1 tablet by mouth every 6 (six) hours as needed.     • HYDROcodone-acetaminophen (NORCO) 5-325 MG per tablet Take 1 tablet by mouth Every 6 (Six) Hours As Needed. for pain     • hydrOXYzine (ATARAX) 25 MG tablet Take 25 mg by mouth 2 (Two) Times a Day As Needed.     • hydrOXYzine pamoate (VISTARIL) 25 MG capsule hydroxyzine pamoate 25 mg capsule   TAKE 1 CAPSULE BY MOUTH 4 TIMES DAILY AS NEEDED FOR ITCHING     • isosorbide mononitrate (IMDUR) 30 MG 24 hr tablet Take 30 mg by mouth Daily.     • isosorbide mononitrate (IMDUR) 60 MG 24 hr tablet TAKE 1 TABLET BY MOUTH EVERY DAY IN THE MORNING FOR 90 DAYS     •  lidocaine (LIDODERM) 5 % 1 PATCH TOPICAL DAILY LEAVE ON MOST PAINFUL AREA FOR UP TO 12 HRS     • morphine (MSIR) 30 MG tablet Take 1 tablet by mouth every 12 (twelve) hours as needed.     • naproxen (NAPROSYN) 500 MG tablet Take 500 mg by mouth 2 (Two) Times a Day.     • naproxen sodium (ALEVE) 220 MG tablet Take 220 mg by mouth 2 (Two) Times a Day As Needed.     • nebivolol (BYSTOLIC) 5 MG tablet Bystolic 5 mg tablet     • nitrofurantoin, macrocrystal-monohydrate, (MACROBID) 100 MG capsule Take 1 capsule by mouth 2 (Two) Times a Day. 14 capsule 0   • omeprazole (priLOSEC) 40 MG capsule Take 40 mg by mouth 2 (Two) Times a Day.  1   • ondansetron ODT (ZOFRAN-ODT) 4 MG disintegrating tablet ondansetron 4 mg disintegrating tablet     • oxyCODONE (ROXICODONE) 5 MG immediate release tablet TAKE 1.5 TABLETS 4 TIMES A DAY AS NEEDED FOR PAIN     • pantoprazole (PROTONIX) 40 MG EC tablet TAKE 1 TABLET BY MOUTH 2 TIMES A DAY BEFORE MEALS. DO NOT CRUSH CHEW OR SPLIT     • potassium chloride (MICRO-K) 10 MEQ CR capsule TAKE 1 CAPSULE BY MOUTH EVERY DAY WITH FOOD 30 capsule 0   • raNITIdine (ZANTAC) 150 MG tablet Take 1 tablet by mouth 2 (Two) Times a Day. 28 tablet 0   • ranolazine (RANEXA) 1000 MG 12 hr tablet TAKE 1 TABLET BY MOUTH TWICE A DAY FOR 90 DAYS     • ranolazine (RANEXA) 500 MG 12 hr tablet Take 500 mg by mouth.     • thiamine (VITAMIN B-1) 100 MG tablet tablet TAKE 1 TABLET BY MOUTH 1 TIME EACH DAY.     • tiZANidine (ZANAFLEX) 4 MG tablet tizanidine 4 mg tablet   TAKE 1 TO 2 TABLETS BY MOUTH AT BEDTIME     • trimethoprim-polymyxin b (POLYTRIM) 93838-4.1 UNIT/ML-% ophthalmic solution polymyxin B sulfate 10,000 unit-trimethoprim 1 mg/mL eye drops     • zolpidem (AMBIEN) 10 MG tablet Take 1 tablet by mouth at night as needed.       No current facility-administered medications on file prior to visit.          Which medication are you concerned about:   DULoxetine (CYMBALTA) 30 MG capsule    atorvastatin (LIPITOR) 80 MG  tablet    clopidogrel (PLAVIX) 75 MG tablet    ranolazine (RANEXA) 1000 MG 12 hr tablet    carvedilol (COREG) 6.25 MG tablet    NITROGLYCERINE 0.4 MG    PREDNISOLONE-MOXIFLOXACIN-NEPAFENAC 1%-0.5%-0.1%    Who prescribed you this medication: [facility] WHO PRESCRIBED ALL MEDS     What are your concerns: CHALINO STATES SHE HAS JUST FINISHED FINALIZING THE PATIENT'S MEDICATION LIST AND THERE ARE 6 MEDICATIONS NOT ON HOSPITAL LIST THAT WERE SENT TO [facility]. CHALINO WOULD LIKE TO MAKE SURE PATIENT IS TO TAKE ALL MEDICATIONS. CHALINO STATES THERE ARE NO INTERACTIONS BETWEEN MEDICATIONS. CHALINO STATES PATIENT DID NOT RECEIVE ANY NEEDLES FOR HER B12 INJECTIONS AND WILL NEED NEEDLES SENT TO PHARMACY.    PHARMACY:   [facility]/pharmacy #[id number] [address] - [phone] [facility] [phone]   [phone]  Associate Signed OrdersPatient EstimateProvidersCurrent Interactions

## 2022-09-16 ENCOUNTER — TELEPHONE (OUTPATIENT)
Dept: FAMILY MEDICINE CLINIC | Facility: CLINIC | Age: 72
End: 2022-09-16

## 2022-09-16 NOTE — TELEPHONE ENCOUNTER
Trip/Tay is requesting verbal orders for continued PT 1-2 times a week for the next 4 weeks. Ph: [phone].  He said please leave a voicemail message.

## 2022-09-20 NOTE — TELEPHONE ENCOUNTER
HUB TO READ:   Patient needs to schedule an appt to get meds updated and refilled if needed. Please schedule.

## 2022-09-20 NOTE — TELEPHONE ENCOUNTER
Patient did not show up for her hospital follow-up which I would need to do to reconcile all of the medications.

## 2022-09-30 ENCOUNTER — OFFICE VISIT (OUTPATIENT)
Dept: FAMILY MEDICINE CLINIC | Facility: CLINIC | Age: 72
End: 2022-09-30

## 2022-09-30 VITALS
DIASTOLIC BLOOD PRESSURE: 70 MMHG | RESPIRATION RATE: 15 BRPM | TEMPERATURE: 98 F | HEART RATE: 79 BPM | BODY MASS INDEX: 25.72 KG/M2 | HEIGHT: 65 IN | SYSTOLIC BLOOD PRESSURE: 112 MMHG | OXYGEN SATURATION: 98 % | WEIGHT: 154.4 LBS

## 2022-09-30 DIAGNOSIS — M54.50 ACUTE LOW BACK PAIN, UNSPECIFIED BACK PAIN LATERALITY, UNSPECIFIED WHETHER SCIATICA PRESENT: Primary | ICD-10-CM

## 2022-09-30 DIAGNOSIS — Z23 ENCOUNTER FOR IMMUNIZATION: ICD-10-CM

## 2022-09-30 PROCEDURE — G0008 ADMIN INFLUENZA VIRUS VAC: HCPCS | Performed by: PHYSICIAN ASSISTANT

## 2022-09-30 PROCEDURE — 90677 PCV20 VACCINE IM: CPT | Performed by: PHYSICIAN ASSISTANT

## 2022-09-30 PROCEDURE — 99214 OFFICE O/P EST MOD 30 MIN: CPT | Performed by: PHYSICIAN ASSISTANT

## 2022-09-30 PROCEDURE — G0009 ADMIN PNEUMOCOCCAL VACCINE: HCPCS | Performed by: PHYSICIAN ASSISTANT

## 2022-09-30 PROCEDURE — 90662 IIV NO PRSV INCREASED AG IM: CPT | Performed by: PHYSICIAN ASSISTANT

## 2022-09-30 RX ORDER — ASPIRIN 81 MG/1
81 TABLET ORAL DAILY
COMMUNITY
End: 2023-01-21

## 2022-09-30 NOTE — PROGRESS NOTES
"      Patient Office Visit      Patient Name: Tony Brown  : 1950   MRN: [id number]     Chief Complaint:    Chief Complaint   Patient presents with   • Back Pain     Patient fell and hurt her back last Friday        History of Present Illness: Tony Brown is a 72 y.o. female who is here today complaining of low back pain after she fell last week.  She says she wants me to send her to a chiropractor.  She also wants a flu shot and a pneumonia shot and says she needs to get her cholesterol checked.  She usually sees Dr. Milligan and is here in a same day acute visit.    Subjective      Review of Systems:   Review of Systems   Musculoskeletal: Positive for back pain.        Past Medical History:   Past Medical History:   Diagnosis Date   • Anxiety    • Arthritis    • Crohn's disease (HCC)        Past Surgical History:   Past Surgical History:   Procedure Laterality Date   • ABDOMINAL SURGERY     • HYSTERECTOMY         Family History:   Family History   Problem Relation Age of Onset   • Alcohol abuse Mother    • Diabetes Mother    • Heart disease Mother    • Stroke Mother        Social History:   Social History     Socioeconomic History   • Marital status: Single   Tobacco Use   • Smoking status: Never Smoker   • Smokeless tobacco: Never Used   Vaping Use   • Vaping Use: Never used   Substance and Sexual Activity   • Alcohol use: No   • Drug use: No   • Sexual activity: Defer       Allergies:   Allergies   Allergen Reactions   • Promethazine Hcl Hives   • Promethazine Anxiety, Other (See Comments) and Rash     Unable to speak and respond (paralysis like state)       Objective     Physical Exam:  Vital Signs:   Vitals:    22 1516   BP: 112/70   BP Location: Right arm   Patient Position: Sitting   Cuff Size: Adult   Pulse: 79   Resp: 15   Temp: 98 °F (36.7 °C)   TempSrc: Temporal   SpO2: 98%   Weight: 70 kg (154 lb 6.4 oz)   Height: 165.1 cm (65\")     Body mass index is 25.69 kg/m².        Physical " Exam  Constitutional:       General: She is not in acute distress.  Neurological:      Mental Status: She is alert and oriented to person, place, and time.   Psychiatric:         Mood and Affect: Mood normal.         Procedures    Assessment / Plan      Assessment/Plan:   Diagnoses and all orders for this visit:    1. Acute low back pain, unspecified back pain laterality, unspecified whether sciatica present (Primary)    Patient will schedule her own appt with chiropractor.  She sees pain management and is already on pain medication.    2. Encounter for immunization  -     Fluzone High-Dose 65+yrs (3612-5974)  -     Pneumococcal Conjugate Vaccine 20-Valent All       Most of the visit today was spent reviewing patient's medication list.  There were numerous medications on the list that patient was no longer taking.  She will need to follow-up with Dr. Milligan to address any labs that may be due.    Medications:     Current Outpatient Medications:   •  aspirin 81 MG EC tablet, Take 81 mg by mouth Daily., Disp: , Rfl:   •  carvedilol (COREG) 6.25 MG tablet, Take 6.25 mg by mouth 2 (Two) Times a Day With Meals., Disp: , Rfl:   •  cyanocobalamin 1000 MCG/ML injection, INJECT 1 MILLILITER SUBCUTANEOUSLY ONCE A MONTH, Disp: , Rfl:   •  DULoxetine HCl (DRIZALMA) 30 MG capsule, Take 2 capsules by mouth., Disp: , Rfl:   •  HYDROcodone-acetaminophen (NORCO)  MG per tablet, Take 1 tablet by mouth every 6 (six) hours as needed., Disp: , Rfl:   •  lidocaine (LIDODERM) 5 %, 1 PATCH TOPICAL DAILY LEAVE ON MOST PAINFUL AREA FOR UP TO 12 HRS, Disp: , Rfl:   •  pantoprazole (PROTONIX) 20 MG EC tablet, Take 20 mg by mouth Daily Before Supper., Disp: , Rfl:   •  pantoprazole (PROTONIX) 40 MG EC tablet, Take 40 mg by mouth Every Morning Before Breakfast., Disp: , Rfl:   •  potassium chloride (MICRO-K) 10 MEQ CR capsule, TAKE 1 CAPSULE BY MOUTH EVERY DAY WITH FOOD, Disp: 30 capsule, Rfl: 0  •  thiamine (VITAMIN B-1) 100 MG tablet  tablet, TAKE 1 TABLET BY MOUTH 1 TIME EACH DAY., Disp: , Rfl:     I spent 30 minutes caring for Tony on this date of service. This time includes time spent by me in the following activities:preparing for the visit, reviewing tests, obtaining and/or reviewing a separately obtained history, performing a medically appropriate examination and/or evaluation , ordering medications, tests, or procedures and documenting information in the medical record    Follow Up:   No follow-ups on file.    Carol Brady PA-C   [facility]

## 2022-10-06 ENCOUNTER — TELEPHONE (OUTPATIENT)
Dept: FAMILY MEDICINE CLINIC | Facility: CLINIC | Age: 72
End: 2022-10-06

## 2022-10-06 NOTE — TELEPHONE ENCOUNTER
PATIENT NOT FEELING WELL AND WOULD LIKE HER CHOLESTEROL CHECKED.  REQUESTING ORDERS.  WILL TESTING LET HER KNOW IF HER BLOOD IS TO THICK?  SHE HAS STENTS AND IS WORRIED ABOUT THIS.      PLEASE CALL [phone]

## 2022-10-07 ENCOUNTER — TELEPHONE (OUTPATIENT)
Dept: FAMILY MEDICINE CLINIC | Facility: CLINIC | Age: 72
End: 2022-10-07

## 2022-10-07 NOTE — TELEPHONE ENCOUNTER
Caller: CHALINO    Relationship: Home Health    Best call back number: [phone]    What orders are you requesting (i.e. lab or imaging): VERBAL ORDER FOR 1 SKILLED NURSE VISIT  NEXT WEEK TO DISCHARGE PATIENT     In what timeframe would the patient need to come in: ASAP    Where will you receive your lab/imaging services: [facility]    Additional notes:

## 2022-10-14 ENCOUNTER — TELEPHONE (OUTPATIENT)
Dept: FAMILY MEDICINE CLINIC | Facility: CLINIC | Age: 72
End: 2022-10-14

## 2022-10-14 NOTE — TELEPHONE ENCOUNTER
Aj, PT from [facility], is requesting verbal orders for continued PT therapy, 1-2 x's a week for 4 weeks.  They did a reassessment on 10/13/2022.  He requests that a voicemail message be left if he is unable to answer.  Ph: [phone]

## 2022-10-17 NOTE — TELEPHONE ENCOUNTER
HOA FROM [facility] CALLED TO FOLLOW UP ON THIS PHONE CALL FROM 11/14/22 FOR A VERBAL TO MEET WITH PT WEEKLY.  I SPOKE WITH DR. WOODS AND SHE SAID IT'S PERFECTLY FINE.    HOA'S CONTACT INFORMATION [phone]

## 2022-10-18 NOTE — TELEPHONE ENCOUNTER
Can someone please call and contact mask from [place] and say that I approve verbal orders for continued physical therapy 1-2 times a week for 4 weeks.

## 2022-11-03 ENCOUNTER — TELEPHONE (OUTPATIENT)
Dept: FAMILY MEDICINE CLINIC | Facility: CLINIC | Age: 72
End: 2022-11-03

## 2022-11-03 NOTE — TELEPHONE ENCOUNTER
CALLER: PT( Mary Ann Rowe)      COMPLAINT: When physical therapist, Mary Ann, stopped by this morning for a session, she noticed pt was very different, not herself, confused, and paranoid. Pt told therapist she was urinating blood, pt showed her a specimen, PT did not see any blood but urine was very dark. She thinks possible UTI.        REQUEST: Assess if pt needs an appt      TELEPHONE: [phone]

## 2022-11-03 NOTE — TELEPHONE ENCOUNTER
Called pt's daughter, she said she would talk to her mother and try to get her to go to urgent care or come to our Saturday walk-in cllinic this weekend.

## 2022-11-05 ENCOUNTER — OFFICE VISIT (OUTPATIENT)
Dept: FAMILY MEDICINE CLINIC | Facility: CLINIC | Age: 72
End: 2022-11-05

## 2022-11-05 VITALS
BODY MASS INDEX: 24.66 KG/M2 | DIASTOLIC BLOOD PRESSURE: 80 MMHG | HEART RATE: 84 BPM | SYSTOLIC BLOOD PRESSURE: 140 MMHG | TEMPERATURE: 97.1 F | OXYGEN SATURATION: 98 % | HEIGHT: 65 IN | WEIGHT: 148 LBS | RESPIRATION RATE: 15 BRPM

## 2022-11-05 DIAGNOSIS — H91.91 HEARING LOSS OF RIGHT EAR, UNSPECIFIED HEARING LOSS TYPE: ICD-10-CM

## 2022-11-05 DIAGNOSIS — H93.11 TINNITUS OF RIGHT EAR: Primary | ICD-10-CM

## 2022-11-05 PROCEDURE — 99213 OFFICE O/P EST LOW 20 MIN: CPT | Performed by: FAMILY MEDICINE

## 2022-11-05 NOTE — PROGRESS NOTES
Follow Up Office Visit      Patient Name: Tony Brown  : 1950   MRN: [id number]     Chief Complaint:    Chief Complaint   Patient presents with   • Tinnitus     + 2 weeks       History of Present Illness: Tony Brown is a 72 y.o. female who is here today to   evaluated for ringing in her ear mostly the right ear has been going on a 2 or 3 weeks now may be a little hearing loss.  No other symptoms.  No chest pains palpitation short of breath no cold symptoms no sore throat no dizziness.    Subjective      Review of Systems:   Review of Systems    Past Medical History:   Past Medical History:   Diagnosis Date   • Anxiety    • Arthritis    • Crohn's disease (HCC)        Past Surgical History:   Past Surgical History:   Procedure Laterality Date   • ABDOMINAL SURGERY     • HYSTERECTOMY         Family History:   Family History   Problem Relation Age of Onset   • Alcohol abuse Mother    • Diabetes Mother    • Heart disease Mother    • Stroke Mother        Social History:   Social History     Socioeconomic History   • Marital status: Single   Tobacco Use   • Smoking status: Never   • Smokeless tobacco: Never   Vaping Use   • Vaping Use: Never used   Substance and Sexual Activity   • Alcohol use: No   • Drug use: No   • Sexual activity: Defer       Medications:     Current Outpatient Medications:   •  aspirin 81 MG EC tablet, Take 81 mg by mouth Daily., Disp: , Rfl:   •  carvedilol (COREG) 6.25 MG tablet, Take 6.25 mg by mouth 2 (Two) Times a Day With Meals., Disp: , Rfl:   •  cyanocobalamin 1000 MCG/ML injection, INJECT 1 MILLILITER SUBCUTANEOUSLY ONCE A MONTH, Disp: , Rfl:   •  DULoxetine HCl (DRIZALMA) 30 MG capsule, Take 2 capsules by mouth., Disp: , Rfl:   •  HYDROcodone-acetaminophen (NORCO)  MG per tablet, Take 1 tablet by mouth every 6 (six) hours as needed., Disp: , Rfl:   •  lidocaine (LIDODERM) 5 %, 1 PATCH TOPICAL DAILY LEAVE ON MOST PAINFUL AREA FOR UP TO 12 HRS, Disp: , Rfl:   •   "pantoprazole (PROTONIX) 20 MG EC tablet, Take 20 mg by mouth Daily Before Supper., Disp: , Rfl:   •  pantoprazole (PROTONIX) 40 MG EC tablet, Take 40 mg by mouth Every Morning Before Breakfast., Disp: , Rfl:   •  potassium chloride (MICRO-K) 10 MEQ CR capsule, TAKE 1 CAPSULE BY MOUTH EVERY DAY WITH FOOD, Disp: 30 capsule, Rfl: 0  •  thiamine (VITAMIN B-1) 100 MG tablet tablet, TAKE 1 TABLET BY MOUTH 1 TIME EACH DAY., Disp: , Rfl:     Allergies:   Allergies   Allergen Reactions   • Promethazine Hcl Hives   • Promethazine Anxiety, Other (See Comments) and Rash     Unable to speak and respond (paralysis like state)       Objective     Physical Exam:  Vital Signs:   Vitals:    11/05/22 1005   BP: 140/80   BP Location: Left arm   Patient Position: Sitting   Cuff Size: Adult   Pulse: 84   Resp: 15   Temp: 97.1 °F (36.2 °C)   TempSrc: Infrared   SpO2: 98%   Weight: 67.1 kg (148 lb)   Height: 165.1 cm (65\")   PainSc: 0-No pain     Body mass index is 24.63 kg/m².     Physical Exam  Vitals and nursing note reviewed.   Constitutional:       Appearance: Normal appearance.      Comments: Alert oriented white female no acute distress slow to get up on exam table with assist of 1 she does have a cane.   HENT:      Head: Normocephalic and atraumatic.      Right Ear: Tympanic membrane and ear canal normal.      Left Ear: Tympanic membrane and ear canal normal.      Ears:      Comments: Hearing little decreased on the right more so than the left to finger rub.  At about 1 foot     Nose: Nose normal.      Mouth/Throat:      Mouth: Mucous membranes are moist.      Pharynx: Oropharynx is clear. No posterior oropharyngeal erythema.   Eyes:      Conjunctiva/sclera: Conjunctivae normal.   Cardiovascular:      Rate and Rhythm: Normal rate and regular rhythm.   Pulmonary:      Effort: Pulmonary effort is normal.      Breath sounds: Normal breath sounds.   Musculoskeletal:      Cervical back: Normal range of motion and neck supple. No " rigidity or tenderness.      Right lower leg: No edema.      Left lower leg: No edema.   Lymphadenopathy:      Cervical: No cervical adenopathy.   Skin:     General: Skin is warm and dry.   Neurological:      General: No focal deficit present.      Mental Status: She is alert.   Psychiatric:         Mood and Affect: Mood normal.         Behavior: Behavior normal.         Procedures    PHQ-9 Total Score:       Assessment / Plan      Assessment/Plan:   Diagnoses and all orders for this visit:    1. Tinnitus of right ear (Primary)  -     Ambulatory Referral to ENT (Otolaryngology)    2. Hearing loss of right ear, unspecified hearing loss type  -     Ambulatory Referral to ENT (Otolaryngology)         Referred to  I told her in the short-term she could try little white noise like run a fan at bedtime if the tinnitus noise is bothering her.    If she does not hear from us in a couple weeks call back or she can call 's office herself.  BMI is within normal parameters. No other follow-up for BMI required.      Follow Up:   Return if symptoms worsen or fail to improve.        Shorty Stark MD  [facility]   Portions of note created with Dragon voice recognition technology

## 2022-11-10 ENCOUNTER — OFFICE VISIT (OUTPATIENT)
Dept: FAMILY MEDICINE CLINIC | Facility: CLINIC | Age: 72
End: 2022-11-10

## 2022-11-10 VITALS
WEIGHT: 150 LBS | BODY MASS INDEX: 24.99 KG/M2 | HEIGHT: 65 IN | OXYGEN SATURATION: 96 % | RESPIRATION RATE: 15 BRPM | SYSTOLIC BLOOD PRESSURE: 102 MMHG | HEART RATE: 64 BPM | TEMPERATURE: 98 F | DIASTOLIC BLOOD PRESSURE: 68 MMHG

## 2022-11-10 DIAGNOSIS — Z11.59 NEED FOR HEPATITIS C SCREENING TEST: ICD-10-CM

## 2022-11-10 DIAGNOSIS — E83.51 HYPOCALCEMIA: ICD-10-CM

## 2022-11-10 DIAGNOSIS — H93.19 TINNITUS, UNSPECIFIED LATERALITY: ICD-10-CM

## 2022-11-10 DIAGNOSIS — Z79.899 HIGH RISK MEDICATION USE: ICD-10-CM

## 2022-11-10 DIAGNOSIS — R09.81 NASAL CONGESTION: ICD-10-CM

## 2022-11-10 DIAGNOSIS — E78.2 MIXED HYPERLIPIDEMIA: ICD-10-CM

## 2022-11-10 DIAGNOSIS — N18.32 STAGE 3B CHRONIC KIDNEY DISEASE (CKD): ICD-10-CM

## 2022-11-10 DIAGNOSIS — R53.83 FATIGUE, UNSPECIFIED TYPE: Primary | ICD-10-CM

## 2022-11-10 DIAGNOSIS — R73.9 HYPERGLYCEMIA: ICD-10-CM

## 2022-11-10 LAB
EXPIRATION DATE: NORMAL
FLUAV AG UPPER RESP QL IA.RAPID: NOT DETECTED
FLUBV AG UPPER RESP QL IA.RAPID: NOT DETECTED
INTERNAL CONTROL: NORMAL
Lab: NORMAL
SARS-COV-2 AG UPPER RESP QL IA.RAPID: NOT DETECTED

## 2022-11-10 PROCEDURE — 99214 OFFICE O/P EST MOD 30 MIN: CPT | Performed by: PHYSICIAN ASSISTANT

## 2022-11-10 PROCEDURE — 36415 COLL VENOUS BLD VENIPUNCTURE: CPT | Performed by: PHYSICIAN ASSISTANT

## 2022-11-10 PROCEDURE — 87428 SARSCOV & INF VIR A&B AG IA: CPT | Performed by: PHYSICIAN ASSISTANT

## 2022-11-10 RX ORDER — DESLORATADINE 5 MG/1
5 TABLET ORAL DAILY
Qty: 90 TABLET | Refills: 0 | Status: SHIPPED | OUTPATIENT
Start: 2022-11-10

## 2022-11-10 NOTE — PROGRESS NOTES
"      Patient Office Visit      Patient Name: Tony Brown  : 1950   MRN: [id number]     Chief Complaint:    Chief Complaint   Patient presents with   • Nasal drainage   • Fatigue   • Tinnitus       History of Present Illness: Tony Brown is a 72 y.o. female who is here today complaining of nasal drainage that is going on for months.  She says this is wearing her down.  She is also complaining of ringing in her ears.  She just saw Dr. Stark this past weekend and she already has a referral pending for ENT.  She also wants her blood levels checked.    Subjective      Review of Systems:   Review of Systems   Constitutional: Positive for fatigue.   HENT: Positive for postnasal drip and tinnitus.    Respiratory: Positive for cough.         Past Medical History:   Past Medical History:   Diagnosis Date   • Anxiety    • Arthritis    • Crohn's disease (HCC)        Past Surgical History:   Past Surgical History:   Procedure Laterality Date   • ABDOMINAL SURGERY     • HYSTERECTOMY         Family History:   Family History   Problem Relation Age of Onset   • Alcohol abuse Mother    • Diabetes Mother    • Heart disease Mother    • Stroke Mother        Social History:   Social History     Socioeconomic History   • Marital status: Single   Tobacco Use   • Smoking status: Never   • Smokeless tobacco: Never   Vaping Use   • Vaping Use: Never used   Substance and Sexual Activity   • Alcohol use: No   • Drug use: No   • Sexual activity: Defer       Allergies:   Allergies   Allergen Reactions   • Promethazine Hcl Hives   • Promethazine Anxiety, Other (See Comments) and Rash     Unable to speak and respond (paralysis like state)       Objective     Physical Exam:  Vital Signs:   Vitals:    11/10/22 1403   BP: 102/68   BP Location: Left arm   Patient Position: Sitting   Cuff Size: Large Adult   Pulse: 64   Resp: 15   Temp: 98 °F (36.7 °C)   TempSrc: Temporal   SpO2: 96%   Weight: 68 kg (150 lb)   Height: 165.1 cm (65\") "     Body mass index is 24.96 kg/m².   BMI is within normal parameters. No other follow-up for BMI required.       Physical Exam  Constitutional:       Appearance: Normal appearance. She is normal weight.   Cardiovascular:      Rate and Rhythm: Normal rate and regular rhythm.   Pulmonary:      Effort: Pulmonary effort is normal.      Breath sounds: Normal breath sounds.   Neurological:      General: No focal deficit present.      Mental Status: She is alert.   Psychiatric:         Attention and Perception: Attention normal.         Mood and Affect: Mood is depressed.         Speech: Speech normal.         Behavior: Behavior normal.         Thought Content: Thought content normal.         Cognition and Memory: Cognition normal.         Judgment: Judgment normal.         Procedures    Assessment / Plan      Assessment/Plan:   Diagnoses and all orders for this visit:    1. Fatigue, unspecified type (Primary)  Assessment & Plan:  We will check some basic blood work and have her follow-up with Dr. Milligan in 1 to 2 weeks.    Orders:  -     Vitamin B12  -     Folate  -     TSH  -     T4, Free    2. Stage 3b chronic kidney disease (CKD) (HCC)  Assessment & Plan:  eGFR in the past has been as low as 33 and January 2021.  We will check a CMP and a vitamin D level today.    Orders:  -     Vitamin D,25-Hydroxy    3. Nasal congestion  Assessment & Plan:  Negative rapid COVID and flu.  We will try her on some Clarinex and she can discuss with ENT.    Orders:  -     POCT SARS-CoV-2 Antigen CANDELARIA  -     desloratadine (Clarinex) 5 MG tablet; Take 1 tablet by mouth Daily.  Dispense: 90 tablet; Refill: 0    4. Mixed hyperlipidemia  -     Lipid Panel    5. High risk medication use  -     Comprehensive Metabolic Panel  -     CBC & Differential  -     CK    6. Hyperglycemia  -     Hemoglobin A1c    7. Need for hepatitis C screening test  -     Hepatitis C Antibody    8. Tinnitus, unspecified laterality  Assessment & Plan:  She already has  a referral ordered for ENT.      9. Hypocalcemia  -     Calcium, Ionized           Medications:     Current Outpatient Medications:   •  aspirin 81 MG EC tablet, Take 81 mg by mouth Daily., Disp: , Rfl:   •  carvedilol (COREG) 6.25 MG tablet, Take 6.25 mg by mouth 2 (Two) Times a Day With Meals., Disp: , Rfl:   •  cyanocobalamin 1000 MCG/ML injection, INJECT 1 MILLILITER SUBCUTANEOUSLY ONCE A MONTH, Disp: , Rfl:   •  DULoxetine HCl (DRIZALMA) 30 MG capsule, Take 2 capsules by mouth., Disp: , Rfl:   •  HYDROcodone-acetaminophen (NORCO)  MG per tablet, Take 1 tablet by mouth every 6 (six) hours as needed., Disp: , Rfl:   •  lidocaine (LIDODERM) 5 %, 1 PATCH TOPICAL DAILY LEAVE ON MOST PAINFUL AREA FOR UP TO 12 HRS, Disp: , Rfl:   •  pantoprazole (PROTONIX) 20 MG EC tablet, Take 20 mg by mouth Daily Before Supper., Disp: , Rfl:   •  pantoprazole (PROTONIX) 40 MG EC tablet, Take 40 mg by mouth Every Morning Before Breakfast., Disp: , Rfl:   •  potassium chloride (MICRO-K) 10 MEQ CR capsule, TAKE 1 CAPSULE BY MOUTH EVERY DAY WITH FOOD, Disp: 30 capsule, Rfl: 0  •  thiamine (VITAMIN B-1) 100 MG tablet tablet, TAKE 1 TABLET BY MOUTH 1 TIME EACH DAY., Disp: , Rfl:   •  desloratadine (Clarinex) 5 MG tablet, Take 1 tablet by mouth Daily., Disp: 90 tablet, Rfl: 0        Follow Up:   Return in about 2 weeks (around 11/24/2022) for 30 minute med recheck.    Carol Brady PA-C   [facility] [place] [facility]

## 2022-11-11 LAB
25(OH)D3+25(OH)D2 SERPL-MCNC: 40.8 NG/ML (ref 30–100)
ALBUMIN SERPL-MCNC: 4.1 G/DL (ref 3.7–4.7)
ALBUMIN/GLOB SERPL: 1.8 {RATIO} (ref 1.2–2.2)
ALP SERPL-CCNC: 86 IU/L (ref 44–121)
ALT SERPL-CCNC: 9 IU/L (ref 0–32)
AST SERPL-CCNC: 22 IU/L (ref 0–40)
BASOPHILS # BLD AUTO: 0 X10E3/UL (ref 0–0.2)
BASOPHILS NFR BLD AUTO: 1 %
BILIRUB SERPL-MCNC: 0.3 MG/DL (ref 0–1.2)
BUN SERPL-MCNC: 6 MG/DL (ref 8–27)
BUN/CREAT SERPL: 5 (ref 12–28)
CA-I SERPL ISE-MCNC: 5.4 MG/DL (ref 4.5–5.6)
CALCIUM SERPL-MCNC: 9.2 MG/DL (ref 8.7–10.3)
CHLORIDE SERPL-SCNC: 103 MMOL/L (ref 96–106)
CHOLEST SERPL-MCNC: 131 MG/DL (ref 100–199)
CK SERPL-CCNC: 111 U/L (ref 32–182)
CO2 SERPL-SCNC: 22 MMOL/L (ref 20–29)
CREAT SERPL-MCNC: 1.2 MG/DL (ref 0.57–1)
EGFRCR SERPLBLD CKD-EPI 2021: 48 ML/MIN/1.73
EOSINOPHIL # BLD AUTO: 0.2 X10E3/UL (ref 0–0.4)
EOSINOPHIL NFR BLD AUTO: 4 %
ERYTHROCYTE [DISTWIDTH] IN BLOOD BY AUTOMATED COUNT: 13.1 % (ref 11.7–15.4)
FOLATE SERPL-MCNC: 9.1 NG/ML
GLOBULIN SER CALC-MCNC: 2.3 G/DL (ref 1.5–4.5)
GLUCOSE SERPL-MCNC: 110 MG/DL (ref 70–99)
HBA1C MFR BLD: 5.6 % (ref 4.8–5.6)
HCT VFR BLD AUTO: 37.7 % (ref 34–46.6)
HCV AB S/CO SERPL IA: 0.1 S/CO RATIO (ref 0–0.9)
HDLC SERPL-MCNC: 58 MG/DL
HGB BLD-MCNC: 12.7 G/DL (ref 11.1–15.9)
IMM GRANULOCYTES # BLD AUTO: 0 X10E3/UL (ref 0–0.1)
IMM GRANULOCYTES NFR BLD AUTO: 0 %
LDLC SERPL CALC-MCNC: 52 MG/DL (ref 0–99)
LYMPHOCYTES # BLD AUTO: 1.3 X10E3/UL (ref 0.7–3.1)
LYMPHOCYTES NFR BLD AUTO: 31 %
MCH RBC QN AUTO: 29.1 PG (ref 26.6–33)
MCHC RBC AUTO-ENTMCNC: 33.7 G/DL (ref 31.5–35.7)
MCV RBC AUTO: 87 FL (ref 79–97)
MONOCYTES # BLD AUTO: 0.2 X10E3/UL (ref 0.1–0.9)
MONOCYTES NFR BLD AUTO: 6 %
NEUTROPHILS # BLD AUTO: 2.4 X10E3/UL (ref 1.4–7)
NEUTROPHILS NFR BLD AUTO: 58 %
PLATELET # BLD AUTO: 152 X10E3/UL (ref 150–450)
POTASSIUM SERPL-SCNC: 4.3 MMOL/L (ref 3.5–5.2)
PROT SERPL-MCNC: 6.4 G/DL (ref 6–8.5)
RBC # BLD AUTO: 4.36 X10E6/UL (ref 3.77–5.28)
SODIUM SERPL-SCNC: 137 MMOL/L (ref 134–144)
T4 FREE SERPL-MCNC: 1.01 NG/DL (ref 0.82–1.77)
TRIGL SERPL-MCNC: 122 MG/DL (ref 0–149)
TSH SERPL DL<=0.005 MIU/L-ACNC: 0.99 UIU/ML (ref 0.45–4.5)
VIT B12 SERPL-MCNC: 934 PG/ML (ref 232–1245)
VLDLC SERPL CALC-MCNC: 21 MG/DL (ref 5–40)
WBC # BLD AUTO: 4.2 X10E3/UL (ref 3.4–10.8)

## 2022-12-07 ENCOUNTER — TELEPHONE (OUTPATIENT)
Dept: FAMILY MEDICINE CLINIC | Facility: CLINIC | Age: 72
End: 2022-12-07

## 2022-12-07 NOTE — TELEPHONE ENCOUNTER
PT.CALLED WANTING A REFERRAL TO [facility]. PT. WAS MADE AWARE SHE WILL NEED TO SEE HER PCP BEFORE SHE CAN GET A REFERRAL. PT. STATED REFERRAL HAS BEEN DISCUSSED.    PT. PHONE NUMBER -[phone]

## 2022-12-08 ENCOUNTER — TELEPHONE (OUTPATIENT)
Dept: FAMILY MEDICINE CLINIC | Facility: CLINIC | Age: 72
End: 2022-12-08

## 2022-12-08 NOTE — TELEPHONE ENCOUNTER
Ruthie tried to contact pt about her medication .. heart medication is not enough information to go on. Please get name of medication when patient calls back.

## 2022-12-08 NOTE — TELEPHONE ENCOUNTER
Caller: Tony Brown    Relationship: Self    Best call back number:[phone]    What is the medical concern/diagnosis: HEART MEDICINE, ARTERY RUINED     What specialty or service is being requested: CARDIOLOGIST    What is the provider, practice or medical service name: [facility]    MECHELLE NUMBER:[phone]

## 2022-12-13 ENCOUNTER — TELEPHONE (OUTPATIENT)
Dept: FAMILY MEDICINE CLINIC | Facility: CLINIC | Age: 72
End: 2022-12-13

## 2022-12-16 ENCOUNTER — OFFICE VISIT (OUTPATIENT)
Dept: FAMILY MEDICINE CLINIC | Facility: CLINIC | Age: 72
End: 2022-12-16

## 2022-12-16 VITALS
SYSTOLIC BLOOD PRESSURE: 118 MMHG | DIASTOLIC BLOOD PRESSURE: 70 MMHG | TEMPERATURE: 98 F | BODY MASS INDEX: 26.99 KG/M2 | RESPIRATION RATE: 15 BRPM | OXYGEN SATURATION: 99 % | WEIGHT: 162 LBS | HEIGHT: 65 IN | HEART RATE: 68 BPM

## 2022-12-16 DIAGNOSIS — I10 PRIMARY HYPERTENSION: ICD-10-CM

## 2022-12-16 DIAGNOSIS — E78.2 MIXED HYPERLIPIDEMIA: ICD-10-CM

## 2022-12-16 DIAGNOSIS — I25.10 CAD S/P PERCUTANEOUS CORONARY ANGIOPLASTY: Primary | ICD-10-CM

## 2022-12-16 DIAGNOSIS — Z98.61 CAD S/P PERCUTANEOUS CORONARY ANGIOPLASTY: Primary | ICD-10-CM

## 2022-12-16 DIAGNOSIS — N18.32 STAGE 3B CHRONIC KIDNEY DISEASE (CKD): ICD-10-CM

## 2022-12-16 PROBLEM — E78.00 HIGH CHOLESTEROL: Status: RESOLVED | Noted: 2021-08-26 | Resolved: 2022-12-16

## 2022-12-16 PROCEDURE — 99214 OFFICE O/P EST MOD 30 MIN: CPT | Performed by: PHYSICIAN ASSISTANT

## 2022-12-16 RX ORDER — ATORVASTATIN CALCIUM 40 MG/1
40 TABLET, FILM COATED ORAL DAILY
COMMUNITY

## 2022-12-16 NOTE — PROGRESS NOTES
Patient Office Visit      Patient Name: Tony Brown  : 1950   MRN: [id number]     Chief Complaint:    Chief Complaint   Patient presents with   • Coronary Artery Disease       History of Present Illness: Tony Brown is a 72 y.o. female who is here today wanting a referral to [facility] [place] at .  She says she has been trying to get in there for 2 weeks but says that she has to have a referral from us and they have to have our records.  She says she is just not getting any better.  She was a very vague historian and got frustrated when I was trying to get information from her regarding past medical history and current symptoms.  She also did not bring her medications with her and was not able to verify whether or not our medication list today is accurate.  She does see a cardiologist here in [place] but says she is not getting any better and wants to see someone at .  She did have stents placed just over a year ago at .  I was able to see those records through care everywhere in epic.    Subjective      Review of Systems:   Review of Systems   Respiratory: Positive for shortness of breath.    Cardiovascular: Positive for chest pain, palpitations and leg swelling.        Past Medical History:   Past Medical History:   Diagnosis Date   • Allodynia 2022   • Anxiety    • Arthritis    • Wooten's esophagus without dysplasia 2021    Last Assessment & Plan:  Formatting of this note might be different from the original. Relevant Hx: dysphagia and neck swelling Course: .long segment Barretts, most recent EGD  no dysplasia Daily Update: change in symtpoms  Today's Plan: EGD   • CAD S/P percutaneous coronary angioplasty 2021   • Chronic pain 2022   • Coronary artery disease    • Crohn disease (HCC) 2021   • Crohn's disease (HCC)    • Degenerative disc disease, lumbar 2021   • Dysphagia 2021   • Fatigue 11/10/2022   • Gastroesophageal reflux disease  "8/26/2021   • Hiatal hernia 8/26/2021   • Hyperglycemia 11/10/2022   • Hyperlipidemia    • Hypertension    • Mixed hyperlipidemia 11/10/2022   • Peripheral neuropathy 5/12/2022   • Stage 3b chronic kidney disease (CKD) (HCC) 11/10/2022   • Superficial peroneal nerve neuropathy 5/12/2022   • Tinnitus 11/10/2022       Past Surgical History:   Past Surgical History:   Procedure Laterality Date   • ABDOMINAL SURGERY     • HYSTERECTOMY         Family History:   Family History   Problem Relation Age of Onset   • Alcohol abuse Mother    • Diabetes Mother    • Heart disease Mother    • Stroke Mother        Social History:   Social History     Socioeconomic History   • Marital status: Single   Tobacco Use   • Smoking status: Never   • Smokeless tobacco: Never   Vaping Use   • Vaping Use: Never used   Substance and Sexual Activity   • Alcohol use: No   • Drug use: No   • Sexual activity: Defer       Allergies:   Allergies   Allergen Reactions   • Promethazine Hcl Hives   • Promethazine Anxiety, Other (See Comments) and Rash     Unable to speak and respond (paralysis like state)       Objective     Physical Exam:  Vital Signs:   Vitals:    12/16/22 1529   BP: 118/70   BP Location: Right arm   Patient Position: Sitting   Cuff Size: Large Adult   Pulse: 68   Resp: 15   Temp: 98 °F (36.7 °C)   TempSrc: Temporal   SpO2: 99%   Weight: 73.5 kg (162 lb)   Height: 165.1 cm (65\")     Body mass index is 26.96 kg/m².        Physical Exam  Psychiatric:         Mood and Affect: Mood is anxious.         Procedures    Assessment / Plan      Assessment/Plan:   Diagnoses and all orders for this visit:    1. CAD S/P percutaneous coronary angioplasty (Primary)  -     Ambulatory Referral to Cardiology  -     Ambulatory Referral to Case Management Disease Education, Gaps in Care, HRCM - High Risk Care Management, Medication Adherence, Rising Risk    2. Primary hypertension  -     Ambulatory Referral to Cardiology  -     Ambulatory Referral to Case " Management Disease Education, Gaps in Care, HRCM - High Risk Care Management, Medication Adherence, Rising Risk    3. Mixed hyperlipidemia  -     Ambulatory Referral to Case Management Disease Education, Gaps in Care, HRCM - High Risk Care Management, Medication Adherence, Rising Risk    4. Stage 3b chronic kidney disease (CKD) (HCC)  -     Ambulatory Referral to Case Management Disease Education, Gaps in Care, HRCM - High Risk Care Management, Medication Adherence, Rising Risk       I will go ahead and request the referral to  [facility] [place].  I am also going to make a referral to case management.  I did have her nurse here meet her today.  Our nurse will call her next week and try to get some of her records from her previous cardiologist and make sure we have an accurate medication list but I assured patient that I have gone ahead and made the referral order.  If patient develops chest pain and shortness of breath that does not readily resolve with rest she should be evaluated at the nearest emergency department.    Medications:     Current Outpatient Medications:   •  aspirin 81 MG EC tablet, Take 81 mg by mouth Daily., Disp: , Rfl:   •  atorvastatin (LIPITOR) 40 MG tablet, Take 40 mg by mouth Daily., Disp: , Rfl:   •  carvedilol (COREG) 6.25 MG tablet, Take 6.25 mg by mouth 2 (Two) Times a Day With Meals., Disp: , Rfl:   •  cyanocobalamin 1000 MCG/ML injection, INJECT 1 MILLILITER SUBCUTANEOUSLY ONCE A MONTH, Disp: , Rfl:   •  desloratadine (Clarinex) 5 MG tablet, Take 1 tablet by mouth Daily., Disp: 90 tablet, Rfl: 0  •  DULoxetine HCl (DRIZALMA) 30 MG capsule, Take 2 capsules by mouth., Disp: , Rfl:   •  HYDROcodone-acetaminophen (NORCO)  MG per tablet, Take 1 tablet by mouth every 6 (six) hours as needed., Disp: , Rfl:   •  lidocaine (LIDODERM) 5 %, 1 PATCH TOPICAL DAILY LEAVE ON MOST PAINFUL AREA FOR UP TO 12 HRS, Disp: , Rfl:   •  pantoprazole (PROTONIX) 20 MG EC tablet, Take 20 mg by mouth Daily  Before Supper., Disp: , Rfl:   •  potassium chloride (MICRO-K) 10 MEQ CR capsule, TAKE 1 CAPSULE BY MOUTH EVERY DAY WITH FOOD, Disp: 30 capsule, Rfl: 0  •  thiamine (VITAMIN B-1) 100 MG tablet tablet, TAKE 1 TABLET BY MOUTH 1 TIME EACH DAY., Disp: , Rfl:     I spent 30 minutes caring for Tony on this date of service. This time includes time spent by me in the following activities:preparing for the visit, reviewing tests, obtaining and/or reviewing a separately obtained history, performing a medically appropriate examination and/or evaluation , counseling and educating the patient/family/caregiver, referring and communicating with other health care professionals  and documenting information in the medical record    Follow Up:   No follow-ups on file.    Carol Brady PA-C   [facility] [place] [facility]

## 2022-12-19 ENCOUNTER — REFERRAL TRIAGE (OUTPATIENT)
Dept: CASE MANAGEMENT | Facility: OTHER | Age: 72
End: 2022-12-19

## 2022-12-22 ENCOUNTER — TELEPHONE (OUTPATIENT)
Dept: CASE MANAGEMENT | Facility: OTHER | Age: 72
End: 2022-12-22

## 2022-12-22 DIAGNOSIS — R09.81 NASAL CONGESTION: ICD-10-CM

## 2022-12-22 RX ORDER — DESLORATADINE 5 MG/1
TABLET ORAL
Qty: 90 TABLET | Refills: 0 | OUTPATIENT
Start: 2022-12-22

## 2022-12-22 NOTE — TELEPHONE ENCOUNTER
Rx Refill Note    Requested Prescriptions     Pending Prescriptions Disp Refills   • potassium chloride (MICRO-K) 10 MEQ CR capsule [Pharmacy Med Name: POTASSIUM CL ER 10 MEQ CAPSULE] 30 capsule 0     Sig: TAKE 1 CAPSULE BY MOUTH EVERY DAY WITH FOOD        Last office visit with prescribing clinician: Visit date not found      Next office visit with prescribing clinician: Visit date not found   Last labs:   Last refill: 05/03/2022   Pharmacy (be sure to add in Epic). correct

## 2022-12-22 NOTE — TELEPHONE ENCOUNTER
Left message for patient to offer CCM services. I have also called [facility] cardiology and requested notes from Dr. Quinonez and ECHO.

## 2022-12-23 RX ORDER — POTASSIUM CHLORIDE 750 MG/1
CAPSULE, EXTENDED RELEASE ORAL
Qty: 30 CAPSULE | Refills: 0 | Status: SHIPPED | OUTPATIENT
Start: 2022-12-23

## 2022-12-28 ENCOUNTER — TELEPHONE (OUTPATIENT)
Dept: CASE MANAGEMENT | Facility: OTHER | Age: 72
End: 2022-12-28

## 2022-12-30 ENCOUNTER — TELEPHONE (OUTPATIENT)
Dept: CASE MANAGEMENT | Facility: OTHER | Age: 72
End: 2022-12-30

## 2023-01-04 ENCOUNTER — PATIENT OUTREACH (OUTPATIENT)
Dept: CASE MANAGEMENT | Facility: OTHER | Age: 73
End: 2023-01-04
Payer: MEDICARE

## 2023-01-04 NOTE — OUTREACH NOTE
AMBULATORY CASE MANAGEMENT NOTE    Name and Relationship of Patient/Support Person: Tony Brown - Self    Adult Patient Profile  Questions/Answers    Flowsheet Row Most Recent Value   Symptoms/Conditions Managed at Home cardiovascular, genitourinary   Barriers to Managing Health financial resources, social support, stress of chronic illness   Cardiovascular Symptoms/Conditions coronary artery disease, hypertension   Cardiovascular Management Strategies activity, adequate rest, coping strategies, exercise, fluid modification, medication therapy, routine screening   Genitourinary Symptoms/Conditions chronic kidney disease   Genitourinary Management Strategies activity, adequate rest, coping strategies, medication therapy   Identifying Health Goals keep illness under control   Equipment Currently Used at Home rollator   Advance Directive Status Patient has advance directive, copy requested   Q1: How often do you have a drink containing alcohol? Never   Q2: How many drinks containing alcohol do you have on a typical day when you are drinking? None   Q3: How often do you have six or more drinks on one occasion? Never   Audit-C Score 0   Little Interest or Pleasure in Doing Things 0-->not at all   Feeling Down, Depressed or Hopeless 0-->not at all   PHQ-2 Total Score 0   PHQ-9: Brief Depression Severity Measure Score 0      SDOH updated and reviewed with the patient during this program:  Financial Resource Strain: Medium Risk   • Difficulty of Paying Living Expenses: Somewhat hard      Physical Activity: Inactive   • Days of Exercise per Week: 0 days   • Minutes of Exercise per Session: 0 min      Food Insecurity: No Food Insecurity   • Worried About Running Out of Food in the Last Year: Never true   • Ran Out of Food in the Last Year: Never true      Social Connections: Socially Isolated   • Frequency of Communication with Friends and Family: More than three times a week   • Frequency of Social Gatherings with  Friends and Family: Twice a week   • Attends Church Services: Never   • Active Member of Clubs or Organizations: No   • Attends Club or Organization Meetings: Never   • Marital Status:       Transportation Needs: No Transportation Needs   • Lack of Transportation (Medical): No   • Lack of Transportation (Non-Medical): No      Housing Stability: Low Risk    • Unable to Pay for Housing in the Last Year: No   • Number of Places Lived in the Last Year: 1   • Unstable Housing in the Last Year: No      Stress: Stress Concern Present   • Feeling of Stress : To some extent   Patient Outreach    Patient qualifies for [facility] but declines due to copay. She did consent to [facility]. Patient has gotten her appointment for . She believes it is 2/16/23. She states that she has been feeling weak. She would like information regarding available transportation services. Either she or her daughter currently drive her. Have call out to [employer] to get more information and will update at next call.     She denies falls in past year but is at risk. Has rollator that she uses. She has [facility] her [facility]. Patient to call her contact with them to see if can help with home safety such as assist bars. She does feel the stress of the economy and living on a fixed income. Her daughter does assist her as needed.     Education Documentation  No documentation found.        ALEJANDRO SEWELL  Ambulatory Case Management    1/4/2023, 10:55 EST

## 2023-01-17 RX ORDER — POTASSIUM CHLORIDE 750 MG/1
CAPSULE, EXTENDED RELEASE ORAL
Qty: 30 CAPSULE | Refills: 0 | OUTPATIENT
Start: 2023-01-17

## 2023-01-17 NOTE — TELEPHONE ENCOUNTER
Rx Refill Note    Requested Prescriptions     Pending Prescriptions Disp Refills   • potassium chloride (MICRO-K) 10 MEQ CR capsule [Pharmacy Med Name: POTASSIUM CL ER 10 MEQ CAPSULE] 30 capsule 0     Sig: TAKE 1 CAPSULE BY MOUTH EVERY DAY WITH FOOD        Last office visit with prescribing clinician: 6/13/2022      Next office visit with prescribing clinician: Visit date not found   Last labs:   Last refill: 12/23/2022   Pharmacy (be sure to add in Epic). correct

## 2023-01-20 ENCOUNTER — TELEPHONE (OUTPATIENT)
Dept: FAMILY MEDICINE CLINIC | Facility: CLINIC | Age: 73
End: 2023-01-20
Payer: MEDICARE

## 2023-01-20 ENCOUNTER — NURSE TRIAGE (OUTPATIENT)
Dept: CALL CENTER | Facility: HOSPITAL | Age: 73
End: 2023-01-20
Payer: MEDICARE

## 2023-01-20 NOTE — TELEPHONE ENCOUNTER
"Caller states she is out of her anticoagulant Eliquis. States she is trying to call her PCP to get a refill. Warm Transfer to [facility] at Dr. Milligan's office.    Reason for Disposition  • [1] Prescription refill request for ESSENTIAL medicine (i.e., likelihood of harm to patient if not taken) AND [2] triager unable to refill per department policy    Additional Information  • Negative: New-onset or worsening symptoms, see that guideline  (e.g., diarrhea, runny nose, sore throat)  • Negative: Medicine question not related to refill or renewal  • Negative: Caller requesting information unrelated to medicine    Answer Assessment - Initial Assessment Questions  1. DRUG NAME: \"What medicine do you need to have refilled?\"      Eliquis  2. REFILLS REMAINING: \"How many refills are remaining?\" (Note: The label on the medicine or pill bottle will show how many refills are remaining. If there are no refills remaining, then a renewal may be needed.)      No refills  3. EXPIRATION DATE: \"What is the expiration date?\" (Note: The label states when the prescription will , and thus can no longer be refilled.)      *No Answer*  4. PRESCRIBING HCP: \"Who prescribed it?\" Reason: If prescribed by specialist, call should be referred to that group.      *Dr. Milligan  5. SYMPTOMS: \"Do you have any symptoms?\"      *No Answer*  6. PREGNANCY: \"Is there any chance that you are pregnant?\" \"When was your last menstrual period?\"      *No Answer*    Protocols used: MEDICATION REFILL AND RENEWAL CALL-ADULT-      "

## 2023-01-21 ENCOUNTER — OFFICE VISIT (OUTPATIENT)
Dept: FAMILY MEDICINE CLINIC | Facility: CLINIC | Age: 73
End: 2023-01-21
Payer: MEDICARE

## 2023-01-21 VITALS — BODY MASS INDEX: 25.83 KG/M2 | HEIGHT: 65 IN | TEMPERATURE: 98.2 F | WEIGHT: 155 LBS

## 2023-01-21 DIAGNOSIS — Z86.73 H/O TIA (TRANSIENT ISCHEMIC ATTACK) AND STROKE: ICD-10-CM

## 2023-01-21 DIAGNOSIS — M25.562 ACUTE PAIN OF LEFT KNEE: Primary | ICD-10-CM

## 2023-01-21 PROCEDURE — 99213 OFFICE O/P EST LOW 20 MIN: CPT | Performed by: FAMILY MEDICINE

## 2023-01-21 RX ORDER — APIXABAN 5 MG/1
5 TABLET, FILM COATED ORAL 2 TIMES DAILY
Qty: 60 TABLET | Refills: 1 | Status: SHIPPED | OUTPATIENT
Start: 2023-01-21 | End: 2023-03-20

## 2023-01-21 RX ORDER — APIXABAN 5 MG/1
1 TABLET, FILM COATED ORAL 2 TIMES DAILY
COMMUNITY
Start: 2022-12-21 | End: 2023-01-21 | Stop reason: SDUPTHER

## 2023-01-21 NOTE — PROGRESS NOTES
Follow Up Office Visit      Patient Name: Tony Brown  : 1950   MRN: [id number]     Chief Complaint:    Chief Complaint   Patient presents with   • Knee Pain     L. Pt fell yesterday.       History of Present Illness: Tony Brown is a 72 y.o. female who is here today to   be evaluated after a fall yesterday and she hurt her left knee she says she is due to have knee replacement there and is followed by Dr. Souza he did her right knee but now her left knee is sore she really does not think it is broken she is using a walker to get around but does hurt.    He is on Norco 10 4 times a day and she asks for something stronger like morphine to help her with the pain.  She says she has tried some Voltaren gel topically.  And has lighted Derm patches at home     Later after the visit she asked for refill of her Eliquis--she says the hospital gave it to her for many strokes and then later in the conversation she said she has a history of blood clots she has been referred to the [facility] and will see them in February.    Subjective      Review of Systems:   Review of Systems    Past Medical History:   Past Medical History:   Diagnosis Date   • Allodynia 2022   • Anxiety    • Arthritis    • Wooten's esophagus without dysplasia 2021    Last Assessment & Plan:  Formatting of this note might be different from the original. Relevant Hx: dysphagia and neck swelling Course: .long segment Barretts, most recent EGD  no dysplasia Daily Update: change in symtpoms  Today's Plan: EGD   • CAD S/P percutaneous coronary angioplasty 2021   • Chronic pain 2022   • Coronary artery disease    • Crohn disease (HCC) 2021   • Crohn's disease (HCC)    • Degenerative disc disease, lumbar 2021   • Dysphagia 2021   • Fatigue 11/10/2022   • Gastroesophageal reflux disease 2021   • Hiatal hernia 2021   • Hyperglycemia 11/10/2022   • Hyperlipidemia    • Hypertension    •  Mixed hyperlipidemia 11/10/2022   • Peripheral neuropathy 5/12/2022   • Stage 3b chronic kidney disease (CKD) (HCC) 11/10/2022   • Superficial peroneal nerve neuropathy 5/12/2022   • Tinnitus 11/10/2022       Past Surgical History:   Past Surgical History:   Procedure Laterality Date   • ABDOMINAL SURGERY     • HYSTERECTOMY         Family History:   Family History   Problem Relation Age of Onset   • Alcohol abuse Mother    • Diabetes Mother    • Heart disease Mother    • Stroke Mother        Social History:   Social History     Socioeconomic History   • Marital status: Single   Tobacco Use   • Smoking status: Never     Passive exposure: Never   • Smokeless tobacco: Never   Vaping Use   • Vaping Use: Never used   Substance and Sexual Activity   • Alcohol use: Not Currently   • Drug use: Never   • Sexual activity: Defer       Medications:     Current Outpatient Medications:   •  atorvastatin (LIPITOR) 40 MG tablet, Take 40 mg by mouth Daily., Disp: , Rfl:   •  carvedilol (COREG) 6.25 MG tablet, Take 6.25 mg by mouth 2 (Two) Times a Day With Meals., Disp: , Rfl:   •  cyanocobalamin 1000 MCG/ML injection, INJECT 1 MILLILITER SUBCUTANEOUSLY ONCE A MONTH, Disp: , Rfl:   •  DULoxetine HCl (DRIZALMA) 30 MG capsule, Take 2 capsules by mouth., Disp: , Rfl:   •  Eliquis 5 MG tablet tablet, Take 1 tablet by mouth 2 (Two) Times a Day., Disp: 60 tablet, Rfl: 1  •  HYDROcodone-acetaminophen (NORCO)  MG per tablet, Take 1 tablet by mouth every 6 (six) hours as needed., Disp: , Rfl:   •  lidocaine (LIDODERM) 5 %, 1 PATCH TOPICAL DAILY LEAVE ON MOST PAINFUL AREA FOR UP TO 12 HRS, Disp: , Rfl:   •  pantoprazole (PROTONIX) 20 MG EC tablet, Take 20 mg by mouth Daily Before Supper., Disp: , Rfl:   •  potassium chloride (MICRO-K) 10 MEQ CR capsule, TAKE 1 CAPSULE BY MOUTH EVERY DAY WITH FOOD, Disp: 30 capsule, Rfl: 0  •  thiamine (VITAMIN B-1) 100 MG tablet tablet, TAKE 1 TABLET BY MOUTH 1 TIME EACH DAY., Disp: , Rfl:   •   "desloratadine (Clarinex) 5 MG tablet, Take 1 tablet by mouth Daily., Disp: 90 tablet, Rfl: 0    Allergies:   Allergies   Allergen Reactions   • Promethazine Hcl Hives   • Promethazine Anxiety, Other (See Comments) and Rash     Unable to speak and respond (paralysis like state)       Objective     Physical Exam:  Vital Signs:   Vitals:    01/21/23 0916   Temp: 98.2 °F (36.8 °C)   TempSrc: Temporal   Weight: 70.3 kg (155 lb)   Height: 165.1 cm (65\")   PainSc: 10-Worst pain ever   PainLoc: Knee     Body mass index is 25.79 kg/m².     Physical Exam  Constitutional:       Appearance: Normal appearance.      Comments: Alert oriented white female using a walker in no acute distress   Musculoskeletal:      Comments: Moderate edema to the left anterior medial knee no redness she does have some tenderness to palpation more so medially but is tender throughout the knee.  On the left.  No hip pain   Neurological:      General: No focal deficit present.      Mental Status: She is alert.   Psychiatric:         Mood and Affect: Mood normal.         Procedures    PHQ-9 Total Score: 0     Assessment / Plan      Assessment/Plan:   Diagnoses and all orders for this visit:    1. Acute pain of left knee (Primary)  -     XR Knee 1 or 2 View Left; Future    2. H/O TIA (transient ischemic attack) and stroke  -     Eliquis 5 MG tablet tablet; Take 1 tablet by mouth 2 (Two) Times a Day.  Dispense: 60 tablet; Refill: 1         I recommended going to the ER where they can get x-rays and have those read quickly she refused this as a second option I recommended getting x-ray at the hospital today and they can call report and may or may not have that report later this afternoon but certainly by tomorrow and then resting she did refuse this as well.  She did agree to me putting an order in for an x-ray at the [facility] that she could do on Monday.    I told her I could not give her any more narcotics she already sees the pain doctor and is at " high dose and I told her if she is having a much pain she needs to be evaluated in the ER.    As an option for pain she can take a little more Tylenol she is already on 1300 mg a day I told her the max dose would be about 3000 mg/day so she could take 650 mg extra every 8 hours for pain and then continue with her Voltaren gel topically.    Instruct her to go to the ER if pain is worse or if she wants to get x-ray done sooner.  And she should follow-up with Dr. Woodruff as directed.    Follow-up with Dr Milligan for checkup and follow-up on her history of TIAs and questionable blood clots I did refill her Eliquis as requested.  And keep her appoint with the [facility].        Follow Up:   Return in 2 months (on 3/21/2023) for Recheck.        Shorty Stark MD  [facility]    Portions of note created with Dragon voice recognition technology

## 2023-01-25 ENCOUNTER — TELEPHONE (OUTPATIENT)
Dept: FAMILY MEDICINE CLINIC | Facility: CLINIC | Age: 73
End: 2023-01-25
Payer: MEDICARE

## 2023-02-10 ENCOUNTER — TELEPHONE (OUTPATIENT)
Dept: CASE MANAGEMENT | Facility: OTHER | Age: 73
End: 2023-02-10
Payer: MEDICARE

## 2023-02-10 NOTE — TELEPHONE ENCOUNTER
Patient discharged from [facility] 2/10/23 to inpatient rehab. Will continue to follow.

## 2023-02-21 ENCOUNTER — HOME HEALTH ADMISSION (OUTPATIENT)
Dept: HOME HEALTH SERVICES | Facility: HOME HEALTHCARE | Age: 73
End: 2023-02-21
Payer: MEDICARE

## 2023-02-21 ENCOUNTER — TRANSCRIBE ORDERS (OUTPATIENT)
Dept: HOME HEALTH SERVICES | Facility: HOME HEALTHCARE | Age: 73
End: 2023-02-21
Payer: MEDICARE

## 2023-02-21 DIAGNOSIS — Z47.1 AFTERCARE FOLLOWING LEFT KNEE JOINT REPLACEMENT SURGERY: Primary | ICD-10-CM

## 2023-02-21 DIAGNOSIS — Z96.652 AFTERCARE FOLLOWING LEFT KNEE JOINT REPLACEMENT SURGERY: Primary | ICD-10-CM

## 2023-02-24 ENCOUNTER — HOME CARE VISIT (OUTPATIENT)
Dept: HOME HEALTH SERVICES | Facility: HOME HEALTHCARE | Age: 73
End: 2023-02-24
Payer: MEDICARE

## 2023-02-24 VITALS
TEMPERATURE: 97.4 F | RESPIRATION RATE: 16 BRPM | DIASTOLIC BLOOD PRESSURE: 60 MMHG | HEART RATE: 72 BPM | OXYGEN SATURATION: 98 % | SYSTOLIC BLOOD PRESSURE: 120 MMHG

## 2023-02-24 PROCEDURE — G0495 RN CARE TRAIN/EDU IN HH: HCPCS

## 2023-02-24 NOTE — HOME HEALTH
SOC Note:    Home Health ordered for: disciplines  SN, PT. OT    Reason for Hosp/Primary Dx/Co-morbidities:  L knee replacement  Hx of HTN, A fib Crohns disease with ileostomy for past 13 years.     Focus of Care:  L knee wound assessment, care education, pain assessment and education, educating on diet for wound healing. Safety assessment and education.     Current Functional status/mobility/DME:  Ambulates with use of a walker. Poor endurance. Using BSC at night time.    HB status/Living Arrangements: Requires use of walker, assist of 1 and frequent rest periods in order to leave home due to L knee pain, poor endurnace, lmited mobility of L knee    Skin Integrity/wound status:  Krunal of 18  skin intact     Code Status: Full COde    Fall Risk: MAHC score of 6.  Pt lives alone, multiple fozia surfaces, with some clutter on the floor and  some tight spaces.     POC confirmed with  Dr Rissa Milligan on 2. 24. 23 via inbasket message  Plan for next visit:  No further Skilled nurse visits planned.  Goals were met today

## 2023-02-27 ENCOUNTER — HOME CARE VISIT (OUTPATIENT)
Dept: HOME HEALTH SERVICES | Facility: HOME HEALTHCARE | Age: 73
End: 2023-02-27
Payer: MEDICARE

## 2023-02-27 VITALS
OXYGEN SATURATION: 97 % | DIASTOLIC BLOOD PRESSURE: 76 MMHG | HEART RATE: 73 BPM | TEMPERATURE: 97.5 F | RESPIRATION RATE: 16 BRPM | SYSTOLIC BLOOD PRESSURE: 104 MMHG

## 2023-02-27 PROCEDURE — G0151 HHCP-SERV OF PT,EA 15 MIN: HCPCS

## 2023-02-28 NOTE — HOME HEALTH
PT Eval Note:   Home Health ordered for: disciplines SN, PT. OT   Reason for Hosp/Primary Dx/Co-morbidities: L knee replacement/ PMH: HTN, A fib Crohns disease with ileostomy for past 13 years, blood clots  Focus of Care: HHPT 2wk3, 1wk3 for gait training, balance training, therapeutic exercise, pt education and HEP instruction related to L TKA  Current Functional status/mobility/DME: gait with FWW, uses BSC   HB status/Living Arrangements: Requires use of walker, assist of 1 and frequent rest periods in order to leave home due to L knee pain, poor endurnace, limited mobility of L knee   Skin Integrity/wound status: L knee incision, dressing intact with no drainage noted  Code Status: Full Code   Fall Risk: High per Tinetti  Plan for next visit: Issue and review HEP [Initial Visit] : an initial visit for [Knee Pain] : knee pain

## 2023-03-02 ENCOUNTER — HOME CARE VISIT (OUTPATIENT)
Dept: HOME HEALTH SERVICES | Facility: HOME HEALTHCARE | Age: 73
End: 2023-03-02
Payer: MEDICARE

## 2023-03-02 VITALS
OXYGEN SATURATION: 95 % | DIASTOLIC BLOOD PRESSURE: 60 MMHG | SYSTOLIC BLOOD PRESSURE: 124 MMHG | HEART RATE: 66 BPM | RESPIRATION RATE: 16 BRPM

## 2023-03-02 VITALS
HEART RATE: 76 BPM | DIASTOLIC BLOOD PRESSURE: 68 MMHG | OXYGEN SATURATION: 98 % | TEMPERATURE: 97.7 F | RESPIRATION RATE: 16 BRPM | SYSTOLIC BLOOD PRESSURE: 104 MMHG

## 2023-03-02 PROCEDURE — G0151 HHCP-SERV OF PT,EA 15 MIN: HCPCS

## 2023-03-02 PROCEDURE — G0152 HHCP-SERV OF OT,EA 15 MIN: HCPCS

## 2023-03-04 NOTE — HOME HEALTH
Routine Visit Note:   Skill/education provided: issued and reviewed written HEP; gait training in home with FWW  Patient/caregiver response: pt fatigues easily; needs cues for equal stance time and step length B in gait  Plan for next visit: advance ther ex as tolerated, add standing exs  Other pertinent info: none

## 2023-03-06 NOTE — HOME HEALTH
OT Eval Note:   Home Health ordered for: disciplines SN, PT. OT   Reason for Hosp/Primary Dx/Co-morbidities: L knee replacement/ PMH: HTN, A fib Crohns disease with ileostomy for past 13 years, blood clots   Focus of Care: HHOT 1w1, 2w1,1w3 for pt education for EC/WS principles w/ ADLIADL skills, AE training/environmental adaptations, balance training, therapeutic exercise, HEP instruction, home safety/fall prevention.   Current Functional status/mobility/DME: walks with 4WW,  BSC, shower chair, hand held shower   HB status/Living Arrangements: Requires use of walker, multi level home w/ 2-3 steps between main home and additions of living area, kitchen, bathroom w/ tub/shower. Pt needs assist of 1 and frequent rest periods in order to leave home due to L knee pain, poor endurnace  Skin Integrity/wound status: L knee incision 75% of staples removed   Code Status: Full   Code Fall Risk: High   Plan for next visit:  HEP, ADL retraining

## 2023-03-07 ENCOUNTER — HOME CARE VISIT (OUTPATIENT)
Dept: HOME HEALTH SERVICES | Facility: HOME HEALTHCARE | Age: 73
End: 2023-03-07
Payer: MEDICARE

## 2023-03-07 VITALS
SYSTOLIC BLOOD PRESSURE: 112 MMHG | DIASTOLIC BLOOD PRESSURE: 64 MMHG | HEART RATE: 68 BPM | RESPIRATION RATE: 16 BRPM | OXYGEN SATURATION: 98 %

## 2023-03-07 PROCEDURE — G0157 HHC PT ASSISTANT EA 15: HCPCS

## 2023-03-08 ENCOUNTER — HOME CARE VISIT (OUTPATIENT)
Dept: HOME HEALTH SERVICES | Facility: HOME HEALTHCARE | Age: 73
End: 2023-03-08
Payer: MEDICARE

## 2023-03-10 ENCOUNTER — HOME CARE VISIT (OUTPATIENT)
Dept: HOME HEALTH SERVICES | Facility: HOME HEALTHCARE | Age: 73
End: 2023-03-10
Payer: MEDICARE

## 2023-03-10 PROCEDURE — G0152 HHCP-SERV OF OT,EA 15 MIN: HCPCS

## 2023-03-10 PROCEDURE — G0157 HHC PT ASSISTANT EA 15: HCPCS

## 2023-03-10 NOTE — HOME HEALTH
PT Routine visit note    Skill/education provided: exercises for TKA protocal to improve L knee ROM/strength, safety awareness, use of AD for safety    Pt. response: pt. with two lob's during session today due to poor decisions and cluttered walking paths, pt. verbalized understanding of safety issues but continued to perform high risk activities during session.  Recommended pt. have supervision in home until she is safer in her living environment, pt. states she doesn't have anyone that can stay for extended time periods, indicating dtr lives 14 miles away and works.      Plan for next visit: ROM/TE for L knee, address safety concerns with patient    Other: plan to discuss with Ariane San, PT and care team regarding patient's safety living alone.

## 2023-03-13 VITALS
SYSTOLIC BLOOD PRESSURE: 118 MMHG | RESPIRATION RATE: 16 BRPM | DIASTOLIC BLOOD PRESSURE: 66 MMHG | OXYGEN SATURATION: 98 % | HEART RATE: 65 BPM

## 2023-03-13 NOTE — HOME HEALTH
Routine Visit Note:    Skill/education provided: reinforce HEP, environment modification to open pathway in bedroom, recommend microwave at upper refrig area, lt HM task for folding of clothes and long shoe horn training.    Patient/caregiver response: Pt tolerated session well. Pt able to access dresser in bedroom w/ opened pathway.     Plan for next visit: ADL/IADL training w/ AD, HEP upgrade     Other pertinent info:

## 2023-03-13 NOTE — HOME HEALTH
PT Routine visit note    Skill/education provided: L knee ROM/strengthening exercises, transfers and gait training; pt. education re: safety awareness to reduce fall risk    Pt. response: Pt. continues to be a fall risk due to poor decision making re: carrying heavy objects while walking and not holding onto AD; pt.'s L knee AROM increased from IE    Plan for next visit: pt. education re use of AD, safety awareness and L knee ROM/strengthening

## 2023-03-15 ENCOUNTER — HOME CARE VISIT (OUTPATIENT)
Dept: HOME HEALTH SERVICES | Facility: HOME HEALTHCARE | Age: 73
End: 2023-03-15
Payer: MEDICARE

## 2023-03-15 NOTE — CASE COMMUNICATION
Patient missed HHPT visit scheduled for 3/13/2023, therefore will not meet visit frequency this week.    Reason: unable to contact patient to confirm time for appointment, two attempts w/ voicemail left, also contacted daughter and asked to have patient call respond.  Patient eventually returned call and indicates she was gone and didn't get message but was not able to reschedule later in day.    Thank you,  Lacy Loving, PTA

## 2023-03-17 ENCOUNTER — HOME CARE VISIT (OUTPATIENT)
Dept: HOME HEALTH SERVICES | Facility: HOME HEALTHCARE | Age: 73
End: 2023-03-17
Payer: MEDICARE

## 2023-03-17 PROCEDURE — G0157 HHC PT ASSISTANT EA 15: HCPCS

## 2023-03-17 PROCEDURE — G0152 HHCP-SERV OF OT,EA 15 MIN: HCPCS

## 2023-03-18 VITALS
HEART RATE: 80 BPM | RESPIRATION RATE: 16 BRPM | SYSTOLIC BLOOD PRESSURE: 123 MMHG | OXYGEN SATURATION: 97 % | DIASTOLIC BLOOD PRESSURE: 61 MMHG

## 2023-03-19 DIAGNOSIS — Z86.73 H/O TIA (TRANSIENT ISCHEMIC ATTACK) AND STROKE: ICD-10-CM

## 2023-03-20 NOTE — HOME HEALTH
Routine Visit Note:    Skill/education provided: home safety/fall prevention education/training, ADL/IADL skill training.    Patient/caregiver response: Pt tolerated session well. Recommend not burning wood in free standing stove, but cont with portable heaters.    Plan for next visit: discharge    Other pertinent info:

## 2023-03-20 NOTE — HOME HEALTH
PT Routine visit note    Skill/education provided: L knee ROM/strengthening exercises, standing exercises     Pt. response: pt. tolerates upgrading HEP to include standing exercises, improved L knee flexion today    Plan for next visit: in/out of home w/ AD weather permitting

## 2023-03-20 NOTE — TELEPHONE ENCOUNTER
Rx Refill Note    Requested Prescriptions     Pending Prescriptions Disp Refills   • Eliquis 5 MG tablet tablet [Pharmacy Med Name: ELIQUIS 5 MG TABLET] 60 tablet 0     Sig: TAKE 1 TABLET BY MOUTH TWICE A DAY        Last office visit with prescribing clinician: 1/21/2023      Next office visit with prescribing clinician: Visit date not found   Last labs:   Last refill: 01/21/2023   Pharmacy (be sure to add in Epic). correct

## 2023-03-21 ENCOUNTER — TELEPHONE (OUTPATIENT)
Dept: FAMILY MEDICINE CLINIC | Facility: CLINIC | Age: 73
End: 2023-03-21
Payer: MEDICARE

## 2023-03-21 RX ORDER — APIXABAN 5 MG/1
TABLET, FILM COATED ORAL
Qty: 60 TABLET | Refills: 0 | Status: SHIPPED | OUTPATIENT
Start: 2023-03-21

## 2023-03-21 NOTE — TELEPHONE ENCOUNTER
CAN YOU CALL PATIENT  I GOT A MEDICATION REFILL REQUEST FOR HER FRANSISCOQUIS SHE HAS NOT BEEN SEEN FOR OVER 3 MONTHS    I WILL SEND IN 1 MONTH

## 2023-03-22 ENCOUNTER — HOME CARE VISIT (OUTPATIENT)
Dept: HOME HEALTH SERVICES | Facility: HOME HEALTHCARE | Age: 73
End: 2023-03-22
Payer: MEDICARE

## 2023-03-22 PROCEDURE — G0157 HHC PT ASSISTANT EA 15: HCPCS

## 2023-03-23 ENCOUNTER — HOME CARE VISIT (OUTPATIENT)
Dept: HOME HEALTH SERVICES | Facility: HOME HEALTHCARE | Age: 73
End: 2023-03-23
Payer: MEDICARE

## 2023-03-23 ENCOUNTER — TELEPHONE (OUTPATIENT)
Dept: FAMILY MEDICINE CLINIC | Facility: CLINIC | Age: 73
End: 2023-03-23
Payer: MEDICARE

## 2023-03-23 VITALS
OXYGEN SATURATION: 96 % | HEART RATE: 71 BPM | DIASTOLIC BLOOD PRESSURE: 76 MMHG | RESPIRATION RATE: 16 BRPM | SYSTOLIC BLOOD PRESSURE: 118 MMHG | TEMPERATURE: 97 F

## 2023-03-23 PROCEDURE — G0152 HHCP-SERV OF OT,EA 15 MIN: HCPCS

## 2023-03-23 NOTE — TELEPHONE ENCOUNTER
Home Health Request:    Patient is being DC from home health care but her home is infested with wasp, which patient is highly allergic to and  nurse is requesting an Epipen sent to local pharmacy.      [facility]

## 2023-03-24 ENCOUNTER — HOME CARE VISIT (OUTPATIENT)
Dept: HOME HEALTH SERVICES | Facility: HOME HEALTHCARE | Age: 73
End: 2023-03-24
Payer: MEDICARE

## 2023-03-24 VITALS
OXYGEN SATURATION: 97 % | DIASTOLIC BLOOD PRESSURE: 60 MMHG | HEART RATE: 71 BPM | SYSTOLIC BLOOD PRESSURE: 112 MMHG | RESPIRATION RATE: 15 BRPM

## 2023-03-24 NOTE — HOME HEALTH
Pt has met OT goals and ready for d/c. Pt is ready to get out of home and participate in organizations -[employer], senior citizen, [employer] exercise class, [facility] [employer]- as prior to L TKR.  [place]  at pt's home, wasps outside next to steps and when sprayed swarmed.  was stung, he is allergic to stings and carries/took his Epipen. Reports pt has infestation in attic, will need to come back at the break of conchis to spray. Pt is also allergic to wasps and no Epipen in home. Called PCP requesting Epipen. RN states they will call into pharmacy.

## 2023-03-27 NOTE — HOME HEALTH
PT Routine visit note    Skill/education provided: L knee ROM TE, standing balance tasks     Pt. response: Pt. reports fatigue with standing exercises but no increase in pain level, pt. demos increased AROM     Plan for next visit: D/C as appropriate

## 2023-04-18 ENCOUNTER — OFFICE VISIT (OUTPATIENT)
Dept: FAMILY MEDICINE CLINIC | Facility: CLINIC | Age: 73
End: 2023-04-18
Payer: MEDICARE

## 2023-04-18 VITALS
OXYGEN SATURATION: 98 % | HEART RATE: 66 BPM | HEIGHT: 65 IN | BODY MASS INDEX: 27.57 KG/M2 | WEIGHT: 165.5 LBS | DIASTOLIC BLOOD PRESSURE: 72 MMHG | SYSTOLIC BLOOD PRESSURE: 100 MMHG

## 2023-04-18 DIAGNOSIS — R30.0 DYSURIA: Primary | ICD-10-CM

## 2023-04-18 DIAGNOSIS — N18.32 STAGE 3B CHRONIC KIDNEY DISEASE (CKD): ICD-10-CM

## 2023-04-18 LAB
BILIRUB BLD-MCNC: NEGATIVE MG/DL
CLARITY, POC: CLEAR
COLOR UR: YELLOW
EXPIRATION DATE: ABNORMAL
GLUCOSE UR STRIP-MCNC: NEGATIVE MG/DL
KETONES UR QL: NEGATIVE
LEUKOCYTE EST, POC: ABNORMAL
Lab: ABNORMAL
NITRITE UR-MCNC: NEGATIVE MG/ML
PH UR: 5.5 [PH] (ref 5–8)
PROT UR STRIP-MCNC: ABNORMAL MG/DL
RBC # UR STRIP: ABNORMAL /UL
SP GR UR: 1.02 (ref 1–1.03)
UROBILINOGEN UR QL: NORMAL

## 2023-04-18 PROCEDURE — 81003 URINALYSIS AUTO W/O SCOPE: CPT | Performed by: FAMILY MEDICINE

## 2023-04-18 PROCEDURE — 99214 OFFICE O/P EST MOD 30 MIN: CPT | Performed by: FAMILY MEDICINE

## 2023-04-18 PROCEDURE — 3074F SYST BP LT 130 MM HG: CPT | Performed by: FAMILY MEDICINE

## 2023-04-18 PROCEDURE — 3078F DIAST BP <80 MM HG: CPT | Performed by: FAMILY MEDICINE

## 2023-04-18 RX ORDER — GABAPENTIN 300 MG/1
300 CAPSULE ORAL EVERY EVENING
COMMUNITY
Start: 2023-04-04

## 2023-04-18 RX ORDER — LISINOPRIL 5 MG/1
TABLET ORAL
COMMUNITY
Start: 2023-03-27

## 2023-04-18 RX ORDER — DULOXETIN HYDROCHLORIDE 30 MG/1
CAPSULE, DELAYED RELEASE ORAL
COMMUNITY
Start: 2023-03-05

## 2023-04-18 RX ORDER — ESTRADIOL 0.1 MG/G
CREAM VAGINAL
COMMUNITY
Start: 2023-04-17

## 2023-04-18 NOTE — PROGRESS NOTES
"Chief Complaint  Urinary Tract Infection (Onset 1 week)    Subjective          Tony Brown presents to [facility]  History of Present Illness  Patient 72-year-old female with numerous different morbidities and medical issues such as coronary artery disease status post stenting, chronic kidney disease who presents with about more than 1 week of worsening dysuria, urgency, frequency, right low CVA tenderness, nausea and vomiting she states that she attempted to get in our office numerous times earlier this week but was unable to do so did not want to go to the ED.  She is here today as the pain in her right flank is unbearable she has had significant nausea vomiting and worsening urinary symptoms the point where she is having trouble urinating.          Objective   Vital Signs:   /72   Pulse 66   Ht 165.1 cm (65\")   Wt 75.1 kg (165 lb 8 oz)   SpO2 98%   BMI 27.54 kg/m²     Body mass index is 27.54 kg/m².    Review of Systems   Constitutional: Negative.    HENT: Negative.    Eyes: Negative.    Respiratory: Negative.    Cardiovascular: Negative.    Gastrointestinal: Negative.    Endocrine: Negative.    Genitourinary: Positive for decreased urine volume, dysuria and urgency.   Musculoskeletal: Negative.    Skin: Negative.    Allergic/Immunologic: Negative.    Neurological: Negative.    Hematological: Negative.    Psychiatric/Behavioral: Negative.        Past History:  Medical History: has a past medical history of Allodynia (5/12/2022), Anxiety, Arthritis, Wooten's esophagus without dysplasia (6/22/2021), CAD S/P percutaneous coronary angioplasty (12/8/2021), Chronic pain (5/12/2022), Coronary artery disease, Crohn disease (8/26/2021), Crohn's disease, Degenerative disc disease, lumbar (8/26/2021), Dysphagia (8/26/2021), Fatigue (11/10/2022), Gastroesophageal reflux disease (8/26/2021), Hiatal hernia (8/26/2021), Hyperglycemia (11/10/2022), Hyperlipidemia, Hypertension, " Mixed hyperlipidemia (11/10/2022), Peripheral neuropathy (5/12/2022), Stage 3b chronic kidney disease (CKD) (11/10/2022), Superficial peroneal nerve neuropathy (5/12/2022), and Tinnitus (11/10/2022).   Surgical History: has a past surgical history that includes Hysterectomy and Abdominal surgery.   Family History: family history includes Alcohol abuse in her mother; Diabetes in her mother; Heart disease in her mother; Stroke in her mother.   Social History: reports that she has never smoked. She has never been exposed to tobacco smoke. She has never used smokeless tobacco. She reports that she does not currently use alcohol. She reports that she does not use drugs.      Current Outpatient Medications:   •  atorvastatin (LIPITOR) 40 MG tablet, Take 1 tablet by mouth Daily., Disp: , Rfl:   •  carvedilol (COREG) 6.25 MG tablet, Take 3.125 mg by mouth 2 (Two) Times a Day With Meals., Disp: , Rfl:   •  cyanocobalamin 1000 MCG/ML injection, INJECT 1 MILLILITER SUBCUTANEOUSLY ONCE A MONTH, Disp: , Rfl:   •  desloratadine (Clarinex) 5 MG tablet, Take 1 tablet by mouth Daily., Disp: 90 tablet, Rfl: 0  •  DULoxetine (CYMBALTA) 30 MG capsule, , Disp: , Rfl:   •  DULoxetine HCl (DRIZALMA) 30 MG capsule, Take 2 capsules by mouth., Disp: , Rfl:   •  Eliquis 5 MG tablet tablet, TAKE 1 TABLET BY MOUTH TWICE A DAY, Disp: 60 tablet, Rfl: 0  •  estradiol (ESTRACE) 0.1 MG/GM vaginal cream, , Disp: , Rfl:   •  gabapentin (NEURONTIN) 300 MG capsule, Take 1 capsule by mouth Every Evening., Disp: , Rfl:   •  HYDROcodone-acetaminophen (NORCO)  MG per tablet, Take 1 tablet by mouth Every 6 (Six) Hours As Needed for Moderate Pain., Disp: , Rfl:   •  lidocaine (LIDODERM) 5 %, 1 PATCH TOPICAL DAILY LEAVE ON MOST PAINFUL AREA FOR UP TO 12 HRS, Disp: , Rfl:   •  lisinopril (PRINIVIL,ZESTRIL) 5 MG tablet, TAKE 1 TABLET BY MOUTH 1 TIME EACH DAY., Disp: , Rfl:   •  pantoprazole (PROTONIX) 20 MG EC tablet, Take 1 tablet by mouth Daily Before  Supper., Disp: , Rfl:   •  potassium chloride (MICRO-K) 10 MEQ CR capsule, TAKE 1 CAPSULE BY MOUTH EVERY DAY WITH FOOD, Disp: 30 capsule, Rfl: 0  •  thiamine (VITAMIN B-1) 100 MG tablet tablet, TAKE 1 TABLET BY MOUTH 1 TIME EACH DAY., Disp: , Rfl:     Allergies: Promethazine hcl and Promethazine    Physical Exam  Vitals reviewed: Patient with significantly tearful affect stating that she is in a lot of pain in her right kidney.   Constitutional:       Appearance: Normal appearance. She is normal weight.   HENT:      Head: Normocephalic and atraumatic.   Eyes:      Conjunctiva/sclera: Conjunctivae normal.   Cardiovascular:      Rate and Rhythm: Normal rate and regular rhythm.   Pulmonary:      Effort: Pulmonary effort is normal.      Breath sounds: Normal breath sounds.   Musculoskeletal:      Cervical back: Normal range of motion and neck supple.      Comments: Significant right CVA tenderness   Skin:     General: Skin is warm and dry.   Neurological:      General: No focal deficit present.      Mental Status: She is alert and oriented to person, place, and time. Mental status is at baseline.          Result Review :                   Assessment and Plan    Diagnoses and all orders for this visit:    1. Dysuria (Primary)  -     Urine Culture - Urine, Urine, Clean Catch; Future  -     Urine Culture - Urine, Urine, Clean Catch  -     POCT urinalysis dipstick, automated  UA here with evidence of UTI will still send off for culture testing I had a long discussion with patient she is obviously in quite distress and quite a bit of pain.  We also do not have updated set of blood work to assess the severity of her already underlying history of CKD especially if she has not been eating and drinking appropriately my concern would be acute dehydration with acute renal failure with possible superimposed acute cystitis versus pyelonephritis.  I discussed that my recommendation would be going directly to the ED to get stat  imaging blood work medication and treatment.  She is agreeable to this I initially did try to call her daughter who did not  patient states she feels like she is safe to go ahead and drive directly to [facility] we will call and give a heads up.  She denies wanting to have EMS called on her.  I have given her strict instructions to go directly there we will then follow-up return precautions provided patient voiced full understanding.  She denies any chest pain shortness of breath or any other cardiac symptoms    2. Stage 3b chronic kidney disease (CKD)  Patient has a known history of this chronically        Follow Up   No follow-ups on file.  Patient was given instructions and counseling regarding her condition or for health maintenance advice. Please see specific information pulled into the AVS if appropriate.     Rissa Milligan DO

## 2023-04-20 LAB
BACTERIA UR CULT: NORMAL
BACTERIA UR CULT: NORMAL

## 2023-04-26 ENCOUNTER — TELEPHONE (OUTPATIENT)
Dept: CASE MANAGEMENT | Facility: OTHER | Age: 73
End: 2023-04-26
Payer: MEDICARE

## 2023-04-28 ENCOUNTER — OFFICE VISIT (OUTPATIENT)
Dept: FAMILY MEDICINE CLINIC | Facility: CLINIC | Age: 73
End: 2023-04-28
Payer: MEDICARE

## 2023-04-28 ENCOUNTER — TELEPHONE (OUTPATIENT)
Dept: FAMILY MEDICINE CLINIC | Facility: CLINIC | Age: 73
End: 2023-04-28

## 2023-04-28 VITALS
BODY MASS INDEX: 28.67 KG/M2 | HEART RATE: 74 BPM | OXYGEN SATURATION: 96 % | WEIGHT: 172.1 LBS | DIASTOLIC BLOOD PRESSURE: 60 MMHG | HEIGHT: 65 IN | SYSTOLIC BLOOD PRESSURE: 120 MMHG

## 2023-04-28 DIAGNOSIS — Z09 HOSPITAL DISCHARGE FOLLOW-UP: ICD-10-CM

## 2023-04-28 DIAGNOSIS — I10 PRIMARY HYPERTENSION: ICD-10-CM

## 2023-04-28 DIAGNOSIS — N17.9 AKI (ACUTE KIDNEY INJURY): ICD-10-CM

## 2023-04-28 DIAGNOSIS — N13.30 BILATERAL HYDRONEPHROSIS: ICD-10-CM

## 2023-04-28 DIAGNOSIS — G62.89 OTHER POLYNEUROPATHY: ICD-10-CM

## 2023-04-28 DIAGNOSIS — K21.9 GASTROESOPHAGEAL REFLUX DISEASE WITHOUT ESOPHAGITIS: ICD-10-CM

## 2023-04-28 DIAGNOSIS — M51.36 DEGENERATIVE DISC DISEASE, LUMBAR: ICD-10-CM

## 2023-04-28 DIAGNOSIS — Z12.31 ENCOUNTER FOR SCREENING MAMMOGRAM FOR MALIGNANT NEOPLASM OF BREAST: ICD-10-CM

## 2023-04-28 DIAGNOSIS — E78.2 MIXED HYPERLIPIDEMIA: ICD-10-CM

## 2023-04-28 DIAGNOSIS — Z93.2 ILEOSTOMY IN PLACE: ICD-10-CM

## 2023-04-28 DIAGNOSIS — N32.89 BLADDER DISTENTION: Primary | ICD-10-CM

## 2023-04-28 RX ORDER — DIAPER,BRIEF,INFANT-TODD,DISP
EACH MISCELLANEOUS
COMMUNITY
Start: 2023-04-21

## 2023-04-28 NOTE — TELEPHONE ENCOUNTER
Can someone call patient and let her know that I do want her to get repeat kidney blood work she had left prior to us discussing that she can either come back today or sometime next week to get blood work done

## 2023-05-03 DIAGNOSIS — Z86.73 H/O TIA (TRANSIENT ISCHEMIC ATTACK) AND STROKE: ICD-10-CM

## 2023-05-03 DIAGNOSIS — R09.81 NASAL CONGESTION: ICD-10-CM

## 2023-05-03 RX ORDER — APIXABAN 5 MG/1
TABLET, FILM COATED ORAL
Qty: 60 TABLET | Refills: 0 | Status: SHIPPED | OUTPATIENT
Start: 2023-05-03

## 2023-05-03 RX ORDER — DESLORATADINE 5 MG/1
TABLET ORAL
Qty: 30 TABLET | Refills: 0 | Status: SHIPPED | OUTPATIENT
Start: 2023-05-03

## 2023-05-03 RX ORDER — POTASSIUM CHLORIDE 750 MG/1
CAPSULE, EXTENDED RELEASE ORAL
Qty: 30 CAPSULE | Refills: 0 | Status: SHIPPED | OUTPATIENT
Start: 2023-05-03

## 2023-05-03 NOTE — TELEPHONE ENCOUNTER
Rx Refill Note    Requested Prescriptions     Pending Prescriptions Disp Refills   • desloratadine (CLARINEX) 5 MG tablet [Pharmacy Med Name: DESLORATADINE 5 MG TABLET] 90 tablet 0     Sig: TAKE 1 TABLET BY MOUTH EVERY DAY        Last office visit with prescribing clinician: 12/16/2022      Next office visit with prescribing clinician: Visit date not found   Last labs:   Last refill: 11/10/2022   Pharmacy (be sure to add in Epic). correct

## 2023-05-03 NOTE — TELEPHONE ENCOUNTER
Rx Refill Note    Requested Prescriptions     Pending Prescriptions Disp Refills   • Eliquis 5 MG tablet tablet [Pharmacy Med Name: ELIQUIS 5 MG TABLET] 60 tablet 0     Sig: TAKE 1 TABLET BY MOUTH TWICE A DAY   • potassium chloride (MICRO-K) 10 MEQ CR capsule [Pharmacy Med Name: POTASSIUM CL ER 10 MEQ CAPSULE] 30 capsule 0     Sig: TAKE 1 CAPSULE BY MOUTH EVERY DAY WITH FOOD        Last office visit with prescribing clinician: 4/28/2023      Next office visit with prescribing clinician: Visit date not found   Last labs:   Last refill: Dr. Milligan patient   Pharmacy (be sure to add in Epic). correct

## 2023-05-11 ENCOUNTER — DOCUMENTATION (OUTPATIENT)
Dept: FAMILY MEDICINE CLINIC | Facility: CLINIC | Age: 73
End: 2023-05-11
Payer: MEDICARE

## 2023-05-11 ENCOUNTER — TELEPHONE (OUTPATIENT)
Dept: FAMILY MEDICINE CLINIC | Facility: CLINIC | Age: 73
End: 2023-05-11
Payer: MEDICARE

## 2023-05-11 NOTE — PROGRESS NOTES
"Just FYI Patient left a voice mail for the on call physician at 4:47 PM stating that she needs Dr Stark to \"give the go ahead\" for her blood thinners so that Dr Souza can proceed with her knee surgery.    I called patient back for clarification, patient again states that the heart doctor and one doctor state that  she needs \"the ok from Dr Stark\" on whether they can do the knee surgery because he prescribes her Eliquis. I advised patient that the after hours physician on call cannot do this and that she needs to call the office during normal business hours to discuss. I also had recommended she have her orthopedic doctor and cardiologist send any information pertaining to this directly to the PCP as where benson not appear to be any related information in the chart, it is unclear whether she needs preop evaluation or other clearance. Pt agrees to call the office during normal business hours to discuss.  "

## 2023-05-12 ENCOUNTER — TELEPHONE (OUTPATIENT)
Dept: FAMILY MEDICINE CLINIC | Facility: CLINIC | Age: 73
End: 2023-05-12
Payer: MEDICARE

## 2023-05-12 NOTE — TELEPHONE ENCOUNTER
PATIENT SET FOR SURGERY.  NEEDS OKAY TO STOP ELIQUIS.  NEEDS LETTER SENT TO BONE DOCTOR, DR RENO.  PLEASE FAX LETTER.      PLEASE CALL [phone]

## 2023-05-24 ENCOUNTER — PATIENT OUTREACH (OUTPATIENT)
Dept: CASE MANAGEMENT | Facility: OTHER | Age: 73
End: 2023-05-24
Payer: MEDICARE

## 2023-05-24 DIAGNOSIS — N18.32 STAGE 3B CHRONIC KIDNEY DISEASE (CKD): Primary | ICD-10-CM

## 2023-05-24 DIAGNOSIS — G89.29 OTHER CHRONIC PAIN: ICD-10-CM

## 2023-05-24 NOTE — OUTREACH NOTE
AMBULATORY CASE MANAGEMENT NOTE    Name and Relationship of Patient/Support Person: Tony Brown L - Self    Patient Outreach  Spoke with patient for HRCM update. Patient is going in Friday for preop at [facility]. Knee surgery is scheduled for 5/31/23. She is unsure if she will be able to go home at discharge or if she will need to go to [facility]. She states that Dr. Rodriges's office has faxed guidelines for eliquis to Dr. Souza's office. She is calling Dr. Souza's office to find out when to stop Eliquis.    She states that her blood pressure has been good. 4/28/23 bp 120/60.     She states that she has not had any more feeling of urinary retention. She was having vaginal dryness. She went to see Dr. Farmer and he prescribed a foam for her and she states it has helped.     Education Documentation  No documentation found.        Ariane SEWELL  Ambulatory Case Management    5/24/2023, 13:56 EDT

## 2023-05-28 DIAGNOSIS — R09.81 NASAL CONGESTION: ICD-10-CM

## 2023-05-30 RX ORDER — DESLORATADINE 5 MG/1
TABLET ORAL
Qty: 90 TABLET | Refills: 1 | Status: SHIPPED | OUTPATIENT
Start: 2023-05-30

## 2023-06-02 DIAGNOSIS — Z86.73 H/O TIA (TRANSIENT ISCHEMIC ATTACK) AND STROKE: ICD-10-CM

## 2023-06-02 RX ORDER — APIXABAN 5 MG/1
TABLET, FILM COATED ORAL
Qty: 60 TABLET | Refills: 1 | Status: SHIPPED | OUTPATIENT
Start: 2023-06-02

## 2023-06-02 NOTE — TELEPHONE ENCOUNTER
Rx Refill Note    Requested Prescriptions     Pending Prescriptions Disp Refills   • Eliquis 5 MG tablet tablet [Pharmacy Med Name: ELIQUIS 5 MG TABLET] 60 tablet 0     Sig: TAKE 1 TABLET BY MOUTH TWICE A DAY        Last office visit with prescribing clinician: 1/21/2023      Next office visit with prescribing clinician: Visit date not found   Last labs:   Last refill:  05/03/2023  Pharmacy (be sure to add in Epic). correct

## 2023-07-24 ENCOUNTER — PATIENT OUTREACH (OUTPATIENT)
Dept: CASE MANAGEMENT | Facility: OTHER | Age: 73
End: 2023-07-24
Payer: MEDICARE

## 2023-07-24 NOTE — OUTREACH NOTE
AMBULATORY CASE MANAGEMENT NOTE    Name and Relationship of Patient/Support Person: Tony Brown L - Self    Patient Outreach    Attempted to reach Ms. Brown for CCM update. Left message for return call.    [place] [facility] 7/19/23. Record scanned  into chart.         Education Documentation  No documentation found.        Ariane SEWELL  Ambulatory Case Management    7/24/2023, 11:33 EDT

## 2023-08-02 ENCOUNTER — TELEPHONE (OUTPATIENT)
Dept: CASE MANAGEMENT | Facility: OTHER | Age: 73
End: 2023-08-02
Payer: MEDICARE

## 2023-08-03 ENCOUNTER — TELEPHONE (OUTPATIENT)
Dept: FAMILY MEDICINE CLINIC | Facility: CLINIC | Age: 73
End: 2023-08-03
Payer: MEDICARE

## 2024-04-02 NOTE — ASSESSMENT & PLAN NOTE
Please see the attached refill request.   eGFR in the past has been as low as 33 and January 2021.  We will check a CMP and a vitamin D level today.
